# Patient Record
Sex: FEMALE | Race: WHITE | NOT HISPANIC OR LATINO | Employment: OTHER | URBAN - METROPOLITAN AREA
[De-identification: names, ages, dates, MRNs, and addresses within clinical notes are randomized per-mention and may not be internally consistent; named-entity substitution may affect disease eponyms.]

---

## 2023-10-02 ENCOUNTER — CLINICAL SUPPORT (OUTPATIENT)
Dept: PRIMARY CARE | Facility: CLINIC | Age: 83
End: 2023-10-02
Payer: COMMERCIAL

## 2023-10-02 DIAGNOSIS — Z23 FLU VACCINE NEED: Primary | ICD-10-CM

## 2023-10-02 DIAGNOSIS — Z23 NEED FOR IMMUNIZATION AGAINST INFLUENZA: Primary | ICD-10-CM

## 2023-10-02 PROCEDURE — 90662 IIV NO PRSV INCREASED AG IM: CPT | Performed by: FAMILY MEDICINE

## 2023-10-02 PROCEDURE — G0008 ADMIN INFLUENZA VIRUS VAC: HCPCS | Performed by: FAMILY MEDICINE

## 2023-10-11 ENCOUNTER — TELEPHONE (OUTPATIENT)
Dept: PRIMARY CARE | Facility: CLINIC | Age: 83
End: 2023-10-11
Payer: COMMERCIAL

## 2023-10-11 DIAGNOSIS — E78.00 LOW-DENSITY-LIPOID-TYPE (LDL) HYPERLIPOPROTEINEMIA: ICD-10-CM

## 2023-10-11 DIAGNOSIS — I10 BENIGN ESSENTIAL HTN: ICD-10-CM

## 2023-10-11 DIAGNOSIS — R73.9 ELEVATED BLOOD SUGAR: ICD-10-CM

## 2023-10-11 DIAGNOSIS — E03.9 HYPOTHYROIDISM, UNSPECIFIED TYPE: ICD-10-CM

## 2023-10-24 PROBLEM — E78.00 LOW-DENSITY-LIPOID-TYPE (LDL) HYPERLIPOPROTEINEMIA: Status: ACTIVE | Noted: 2023-10-24

## 2023-10-24 PROBLEM — E03.9 HYPOTHYROIDISM, UNSPECIFIED: Status: ACTIVE | Noted: 2022-03-28

## 2024-03-18 DIAGNOSIS — E03.9 HYPOTHYROIDISM, UNSPECIFIED TYPE: ICD-10-CM

## 2024-03-18 DIAGNOSIS — I10 BENIGN ESSENTIAL HTN: ICD-10-CM

## 2024-03-19 RX ORDER — METOPROLOL SUCCINATE 50 MG/1
50 TABLET, EXTENDED RELEASE ORAL DAILY
Qty: 90 TABLET | Refills: 3 | Status: SHIPPED | OUTPATIENT
Start: 2024-03-19

## 2024-03-19 RX ORDER — LEVOTHYROXINE SODIUM 50 UG/1
50 TABLET ORAL DAILY
Qty: 90 TABLET | Refills: 3 | Status: SHIPPED | OUTPATIENT
Start: 2024-03-19

## 2024-04-10 ENCOUNTER — LAB (OUTPATIENT)
Dept: LAB | Facility: LAB | Age: 84
End: 2024-04-10
Payer: COMMERCIAL

## 2024-04-10 DIAGNOSIS — I10 BENIGN ESSENTIAL HTN: ICD-10-CM

## 2024-04-10 DIAGNOSIS — R73.9 ELEVATED BLOOD SUGAR: ICD-10-CM

## 2024-04-10 DIAGNOSIS — E78.00 LOW-DENSITY-LIPOID-TYPE (LDL) HYPERLIPOPROTEINEMIA: ICD-10-CM

## 2024-04-10 DIAGNOSIS — E03.9 HYPOTHYROIDISM, UNSPECIFIED TYPE: ICD-10-CM

## 2024-04-10 LAB
ALBUMIN SERPL-MCNC: 4.4 G/DL (ref 3.5–5)
ALP BLD-CCNC: 88 U/L (ref 35–125)
ALT SERPL-CCNC: 19 U/L (ref 5–40)
ANION GAP SERPL CALC-SCNC: 13 MMOL/L
AST SERPL-CCNC: 26 U/L (ref 5–40)
BASOPHILS # BLD AUTO: 0.08 X10*3/UL (ref 0–0.1)
BASOPHILS NFR BLD AUTO: 1.2 %
BILIRUB SERPL-MCNC: 0.6 MG/DL (ref 0.1–1.2)
BUN SERPL-MCNC: 23 MG/DL (ref 8–25)
CALCIUM SERPL-MCNC: 9 MG/DL (ref 8.5–10.4)
CHLORIDE SERPL-SCNC: 104 MMOL/L (ref 97–107)
CHOLEST SERPL-MCNC: 154 MG/DL (ref 133–200)
CHOLEST/HDLC SERPL: 2.9 {RATIO}
CO2 SERPL-SCNC: 26 MMOL/L (ref 24–31)
CREAT SERPL-MCNC: 1 MG/DL (ref 0.4–1.6)
EGFRCR SERPLBLD CKD-EPI 2021: 56 ML/MIN/1.73M*2
EOSINOPHIL # BLD AUTO: 0.16 X10*3/UL (ref 0–0.4)
EOSINOPHIL NFR BLD AUTO: 2.4 %
ERYTHROCYTE [DISTWIDTH] IN BLOOD BY AUTOMATED COUNT: 13.2 % (ref 11.5–14.5)
EST. AVERAGE GLUCOSE BLD GHB EST-MCNC: 105 MG/DL
GLUCOSE SERPL-MCNC: 104 MG/DL (ref 65–99)
HBA1C MFR BLD: 5.3 %
HCT VFR BLD AUTO: 41.4 % (ref 36–46)
HDLC SERPL-MCNC: 54 MG/DL
HGB BLD-MCNC: 13.3 G/DL (ref 12–16)
IMM GRANULOCYTES # BLD AUTO: 0.01 X10*3/UL (ref 0–0.5)
IMM GRANULOCYTES NFR BLD AUTO: 0.2 % (ref 0–0.9)
LDLC SERPL CALC-MCNC: 85 MG/DL (ref 65–130)
LYMPHOCYTES # BLD AUTO: 1.82 X10*3/UL (ref 0.8–3)
LYMPHOCYTES NFR BLD AUTO: 27.7 %
MCH RBC QN AUTO: 30.5 PG (ref 26–34)
MCHC RBC AUTO-ENTMCNC: 32.1 G/DL (ref 32–36)
MCV RBC AUTO: 95 FL (ref 80–100)
MONOCYTES # BLD AUTO: 0.43 X10*3/UL (ref 0.05–0.8)
MONOCYTES NFR BLD AUTO: 6.5 %
NEUTROPHILS # BLD AUTO: 4.07 X10*3/UL (ref 1.6–5.5)
NEUTROPHILS NFR BLD AUTO: 62 %
NRBC BLD-RTO: 0 /100 WBCS (ref 0–0)
PLATELET # BLD AUTO: 247 X10*3/UL (ref 150–450)
POTASSIUM SERPL-SCNC: 4.5 MMOL/L (ref 3.4–5.1)
PROT SERPL-MCNC: 6.8 G/DL (ref 5.9–7.9)
RBC # BLD AUTO: 4.36 X10*6/UL (ref 4–5.2)
SODIUM SERPL-SCNC: 143 MMOL/L (ref 133–145)
TRIGL SERPL-MCNC: 73 MG/DL (ref 40–150)
TSH SERPL DL<=0.05 MIU/L-ACNC: 2.81 MIU/L (ref 0.27–4.2)
WBC # BLD AUTO: 6.6 X10*3/UL (ref 4.4–11.3)

## 2024-04-10 PROCEDURE — 85025 COMPLETE CBC W/AUTO DIFF WBC: CPT

## 2024-04-10 PROCEDURE — 36415 COLL VENOUS BLD VENIPUNCTURE: CPT

## 2024-04-10 PROCEDURE — 80061 LIPID PANEL: CPT

## 2024-04-10 PROCEDURE — 84443 ASSAY THYROID STIM HORMONE: CPT

## 2024-04-10 PROCEDURE — 83036 HEMOGLOBIN GLYCOSYLATED A1C: CPT

## 2024-04-10 PROCEDURE — 80053 COMPREHEN METABOLIC PANEL: CPT

## 2024-04-18 ENCOUNTER — OFFICE VISIT (OUTPATIENT)
Dept: PRIMARY CARE | Facility: CLINIC | Age: 84
End: 2024-04-18
Payer: COMMERCIAL

## 2024-04-18 VITALS
HEIGHT: 62 IN | HEART RATE: 77 BPM | WEIGHT: 105 LBS | SYSTOLIC BLOOD PRESSURE: 135 MMHG | DIASTOLIC BLOOD PRESSURE: 68 MMHG | BODY MASS INDEX: 19.32 KG/M2 | OXYGEN SATURATION: 98 %

## 2024-04-18 DIAGNOSIS — Z12.31 VISIT FOR SCREENING MAMMOGRAM: ICD-10-CM

## 2024-04-18 DIAGNOSIS — F41.9 ANXIETY: ICD-10-CM

## 2024-04-18 DIAGNOSIS — I10 BENIGN ESSENTIAL HTN: Primary | ICD-10-CM

## 2024-04-18 DIAGNOSIS — E03.9 HYPOTHYROIDISM, UNSPECIFIED TYPE: ICD-10-CM

## 2024-04-18 DIAGNOSIS — Z00.00 WELL ADULT EXAM: ICD-10-CM

## 2024-04-18 PROCEDURE — 93000 ELECTROCARDIOGRAM COMPLETE: CPT | Performed by: FAMILY MEDICINE

## 2024-04-18 PROCEDURE — 3075F SYST BP GE 130 - 139MM HG: CPT | Performed by: FAMILY MEDICINE

## 2024-04-18 PROCEDURE — G0439 PPPS, SUBSEQ VISIT: HCPCS | Performed by: FAMILY MEDICINE

## 2024-04-18 PROCEDURE — 3078F DIAST BP <80 MM HG: CPT | Performed by: FAMILY MEDICINE

## 2024-04-18 PROCEDURE — 1159F MED LIST DOCD IN RCRD: CPT | Performed by: FAMILY MEDICINE

## 2024-04-18 PROCEDURE — 1036F TOBACCO NON-USER: CPT | Performed by: FAMILY MEDICINE

## 2024-04-18 PROCEDURE — 1126F AMNT PAIN NOTED NONE PRSNT: CPT | Performed by: FAMILY MEDICINE

## 2024-04-18 PROCEDURE — 1170F FXNL STATUS ASSESSED: CPT | Performed by: FAMILY MEDICINE

## 2024-04-18 RX ORDER — AMLODIPINE BESYLATE 5 MG/1
5 TABLET ORAL DAILY
Qty: 90 TABLET | Refills: 3 | Status: SHIPPED | OUTPATIENT
Start: 2024-04-18 | End: 2025-04-18

## 2024-04-18 RX ORDER — AMLODIPINE BESYLATE 5 MG/1
5 TABLET ORAL DAILY
COMMUNITY
End: 2024-04-18 | Stop reason: SDUPTHER

## 2024-04-18 ASSESSMENT — ENCOUNTER SYMPTOMS
LOSS OF SENSATION IN FEET: 0
OCCASIONAL FEELINGS OF UNSTEADINESS: 0
DEPRESSION: 0

## 2024-04-18 ASSESSMENT — PATIENT HEALTH QUESTIONNAIRE - PHQ9
1. LITTLE INTEREST OR PLEASURE IN DOING THINGS: NOT AT ALL
2. FEELING DOWN, DEPRESSED OR HOPELESS: NOT AT ALL
SUM OF ALL RESPONSES TO PHQ9 QUESTIONS 1 AND 2: 0

## 2024-04-18 ASSESSMENT — ACTIVITIES OF DAILY LIVING (ADL)
GROCERY_SHOPPING: INDEPENDENT
TAKING_MEDICATION: INDEPENDENT
BATHING: INDEPENDENT
DOING_HOUSEWORK: INDEPENDENT
MANAGING_FINANCES: INDEPENDENT
DRESSING: INDEPENDENT

## 2024-04-18 ASSESSMENT — PAIN SCALES - GENERAL: PAINLEVEL: 0-NO PAIN

## 2024-04-18 NOTE — PROGRESS NOTES
Subjective   Patient ID: Caryn Agarwal is a 83 y.o. female who presents for Medicare Annual Wellness Visit Subsequent (EKG-   2018/Mammogram-   needs order today/Colonoscopy   12/2016).    HPI   CARYN is seen for for her comprehensive physical exam. PMH, PSH, family history and social history were reviewed and updated.  CARYN is here for follow-up of hypertension. Patient denies chest pain, shortness of breath and dizziness .  On metoprolol succinate 50 mg qd, and amlodipine 5 mg qd.  CARYN is seen for follow-up of Hypothyroidism. She denies palpitations, weight gain and weight loss. On levothyroxine 50 mcg qd. Recent TSH is therapeutic. Pt is asymptomatic  Patient has intermittent anxiety uses alprazolam as needed.  Patient has a history of intermittent headaches that are preceded with an aura . She gets them very infrequently and ibuprofen is effective for them.    Patient had a tetanus shot in 2016.  She has not had a shingles immunization.  She had Prevnar 13 in 2017.  She was immunized against coronavirus x5.  She had her influenza immunization 10/2023.   Patient had a colonoscopy in 2016.  Patient is a former tobacco user but quit more than 40 years ago.  She does not use alcohol.  Review of Systems  Constitutional Symptoms:  She is negative for fever, night sweats, hot flashes, weight loss, Weight loss due to diet, weight gain due to diet, unexpected weight gain, loss of appetite, increased appetite, headaches, fatigue, abnormal activity level, Sleep Disturbance, Recent Illness.   Eyes:  She is negative for blindness, blind spots, loss and blurring of vision, double vision, swelling, redness, pruritus, eye pain, discharge, dryness of eyes.   Ear, Nose, Mouth, Throat:  She is negative for hearing loss, wearing hearing aids, tinnitus, ear pain, ear drainage, dizziness, allergies, nasal congestion, rhinorrhea, nasal obstruction, post nasal drip, nose bleeds, teeth problems, wearing dentures, mouth sores, gum  disease, dysphagia, hoarseness, sore throat, tinnitus, sleep apnea.   Cardiovascular:  She is negative for chest pain/pressure, radiation of pain, palpitations, shortness of breath, dyspnea on exertion, orthopnea, syncope, diaphoresis, cyanosis, edema.   Respiratory:  She is negative for shortness of breath, dyspnea on exertion, coughing, sputum, hemoptysis, wheezing, snoring.   Breast:  She is negative for tenderness, masses, nipple discharge, gynecomastia.   Gastrointestinal:  She is negative for anorexia, indigestion, increased belching, food intolerance, use of antacids, nausea, hematemesis, jaundice, abdominal pain, change in bowel habits, diarrhea, constipation, abnormal stools, hematochezia, melena, blood in stool, increased flatus, hemorrhoids.   Female Genitourinary:  She is negative for frequency, nocturia, dysuria, hematuria, recurrent UTIs, hot flashes, Dyspareunia.   Musculoskeletal:  She is positive for joint pain - (hand arthritis, right knee, intermittent). She is negative for joint swelling, joint warmth, joint redness, myalgias.   Integumentary:  She is positive for Non-healing skin lesion - (skin Ca, sees Derm). She is negative for change in mole, skin trouble or rash, itching, dryness, loss of hair, hirsutism.   Neurological:  She is positive for occasional headache with visual aura. She is negative for headache, speech difficulty, numbness, tingling, weakness, paralysis, tremors, dizziness, syncope, balance problems, memory loss, .   Psychiatric:  She is negative for depression, moodiness, anhedonia, change in sleep pattern, disturbing thoughts or feelings, change in libido, suicidal thoughts or attempts, anxiety, panic attacks, obsessive thoughts, compulsions, hyperactivity.   Endocrine:  She is negative for weight gain, heat or cold intolerance, excessive sweating, polydipsia.   Hematologic/Lymphatic:  She is negative for bruising, abnormal bleeding, bleeding gums, nose bleeds, swollen  "glands.  Objective   /68   Pulse 77   Ht 1.575 m (5' 2\")   Wt 47.6 kg (105 lb)   SpO2 98%   BMI 19.20 kg/m²     Physical Exam  General Appearance: LENA is in no acute distress. She is well nourished, well developed. The patient is awake and alert and appears stated age. LENA is cooperative with exam.  Head:   LENA's hair pattern is normal for patients age and The scalp is normal . The skull is normocephalic, atraumatic. The face is unremarkable with no facial droop. Palpation of the head reveals no tenderness or masses.  Eyes: PERRLA, EOMI, no scleral icterus.  Normal position and alignment. Eyebrows are normal.  Ears, Nose, Mouth, Throat:   EARS: External bilateral ears reveal normal helix, tragus and ear lobe.  Both canals are normal.  Tympanic membranes are pearly gray, normal landmarks, good light reflex. Hearing is normal to finger rub   NOSE: Nasal mucosa is normal with no polyps, ulcers or lesions in Septum has no deviation.   MOUTH: Lips are normal with no lesion. Oral mucosa is moist.  Hard and soft palates are normal. Teeth are in good repair. Tongue reveals is normal. Tonsils are present with no lesions. Uvula is normal. Posterior pharynx without lesions.  Neck: Inspection of the neck reveals no masses or JVD.   Inspection reveals normal thyroid gland. Palpation shows normal thyroid gland. with No carotid bruit present.   Anterior cervical lymph node chains are unremarkable. Posterior chains are unremarkable.  Chest: Chest is symmetric. Lungs are clear to auscultation and percussion, no respiratory distress. Breathing is normal.  Cardiovascular: Heart is RRR, normal S1, S2. No murmurs, rubs or gallops. PMI is normal in left 6th IC space midclavicular line. Femoral pulses are present and without bruits. DP pulses are present. Extremities: no edema, clubbing, cyanosis, or varicosities.  Breast: INSPECTION: Size and symmetry: Breasts are normal and symmetric . PALPATION: Skin of Breasts are " normal. Breast tissue: normal with no significant masses Nipples: without discharge.  Abdomen: Abdomen is soft, NT, ND. BS + 4 quadrants. No organomegaly or masses..  Genitourinary:  Deferred  Lymph Nodes: Palpation of the cervical area are within normal limit. Palpation of the axillary area are within normal limit. Palpation of the inguinal area are within normal limit.  Musculoskeletal:  patient has mild area of edema on the dorsum of the left hand with resolving ecchymosis.  Mildly tender to palpation.Patient has a valgus deformity of the right lower leg. Gait is normal. Extremities: Full Range of motion and strength throughout.  Skin: Skin has deferred, sees Dermatology.  Neurological: Intact and non-focal. Cranial nerves II - XII are grossly intact. Motor exams reveals normal tone and strength , Sensation: normal to touch, position and vibration. DTR: are +2/4 and symmetric at the AJ and KJ and no Babinski.  Psychiatric: Proper orientation to person, place and time. Recent memory is intact. Remote memory is intact. Patient's mood is normal with good eye contact. Affect is appropriate.  Assessment/Plan   Problem List Items Addressed This Visit             ICD-10-CM    Benign essential HTN - Primary   stable.  Continue on amlodipine 5 mg daily, and metoprolol succinate 50 mg daily. I10    Relevant Medications    amLODIPine (Norvasc) 5 mg tablet    Hypothyroidism, unspecified   stable.  Continue on levothyroxine 50 mcg daily. E03.9     Other Visit Diagnoses         Codes    Well adult exam    normal exam. Z00.00    Relevant Orders    ECG 12 lead (Clinic Performed) (Completed)    Anxiety    intermittent.  Continue use alprazolam as needed. F41.9    Visit for screening mammogram    normal exam.  Patient given requisition for mammogram. Z12.31    Relevant Orders    BI mammo bilateral screening tomosynthesis

## 2024-04-23 ENCOUNTER — APPOINTMENT (OUTPATIENT)
Dept: RADIOLOGY | Facility: HOSPITAL | Age: 84
DRG: 310 | End: 2024-04-23
Payer: COMMERCIAL

## 2024-04-23 ENCOUNTER — APPOINTMENT (OUTPATIENT)
Dept: CARDIOLOGY | Facility: HOSPITAL | Age: 84
DRG: 310 | End: 2024-04-23
Payer: COMMERCIAL

## 2024-04-23 ENCOUNTER — HOSPITAL ENCOUNTER (OUTPATIENT)
Facility: HOSPITAL | Age: 84
Discharge: HOME | DRG: 310 | End: 2024-04-24
Attending: STUDENT IN AN ORGANIZED HEALTH CARE EDUCATION/TRAINING PROGRAM | Admitting: INTERNAL MEDICINE
Payer: COMMERCIAL

## 2024-04-23 DIAGNOSIS — I48.91 ATRIAL FIBRILLATION WITH RAPID VENTRICULAR RESPONSE (MULTI): Primary | ICD-10-CM

## 2024-04-23 DIAGNOSIS — R11.0 NAUSEA: ICD-10-CM

## 2024-04-23 DIAGNOSIS — R06.02 SHORTNESS OF BREATH AT REST: ICD-10-CM

## 2024-04-23 LAB
ALBUMIN SERPL-MCNC: 4 G/DL (ref 3.5–5)
ALP BLD-CCNC: 83 U/L (ref 35–125)
ALT SERPL-CCNC: 19 U/L (ref 5–40)
ANION GAP SERPL CALC-SCNC: 13 MMOL/L
APTT PPP: 25.7 SECONDS (ref 22–32.5)
AST SERPL-CCNC: 24 U/L (ref 5–40)
ATRIAL RATE: 141 BPM
BASOPHILS # BLD AUTO: 0.06 X10*3/UL (ref 0–0.1)
BASOPHILS NFR BLD AUTO: 0.7 %
BILIRUB SERPL-MCNC: 0.8 MG/DL (ref 0.1–1.2)
BUN SERPL-MCNC: 22 MG/DL (ref 8–25)
CALCIUM SERPL-MCNC: 8.9 MG/DL (ref 8.5–10.4)
CHLORIDE SERPL-SCNC: 97 MMOL/L (ref 97–107)
CO2 SERPL-SCNC: 23 MMOL/L (ref 24–31)
CREAT SERPL-MCNC: 0.9 MG/DL (ref 0.4–1.6)
EGFRCR SERPLBLD CKD-EPI 2021: 64 ML/MIN/1.73M*2
EOSINOPHIL # BLD AUTO: 0.06 X10*3/UL (ref 0–0.4)
EOSINOPHIL NFR BLD AUTO: 0.7 %
ERYTHROCYTE [DISTWIDTH] IN BLOOD BY AUTOMATED COUNT: 12.9 % (ref 11.5–14.5)
ERYTHROCYTE [DISTWIDTH] IN BLOOD BY AUTOMATED COUNT: 12.9 % (ref 11.5–14.5)
GLUCOSE SERPL-MCNC: 146 MG/DL (ref 65–99)
HCT VFR BLD AUTO: 39.1 % (ref 36–46)
HCT VFR BLD AUTO: 39.8 % (ref 36–46)
HGB BLD-MCNC: 13 G/DL (ref 12–16)
HGB BLD-MCNC: 13.2 G/DL (ref 12–16)
IMM GRANULOCYTES # BLD AUTO: 0.02 X10*3/UL (ref 0–0.5)
IMM GRANULOCYTES NFR BLD AUTO: 0.2 % (ref 0–0.9)
LIPASE SERPL-CCNC: 40 U/L (ref 16–63)
LYMPHOCYTES # BLD AUTO: 1.4 X10*3/UL (ref 0.8–3)
LYMPHOCYTES NFR BLD AUTO: 16.6 %
MCH RBC QN AUTO: 30.2 PG (ref 26–34)
MCH RBC QN AUTO: 30.3 PG (ref 26–34)
MCHC RBC AUTO-ENTMCNC: 33.2 G/DL (ref 32–36)
MCHC RBC AUTO-ENTMCNC: 33.2 G/DL (ref 32–36)
MCV RBC AUTO: 91 FL (ref 80–100)
MCV RBC AUTO: 91 FL (ref 80–100)
MONOCYTES # BLD AUTO: 0.34 X10*3/UL (ref 0.05–0.8)
MONOCYTES NFR BLD AUTO: 4 %
NEUTROPHILS # BLD AUTO: 6.54 X10*3/UL (ref 1.6–5.5)
NEUTROPHILS NFR BLD AUTO: 77.8 %
NRBC BLD-RTO: 0 /100 WBCS (ref 0–0)
NRBC BLD-RTO: 0 /100 WBCS (ref 0–0)
PLATELET # BLD AUTO: 197 X10*3/UL (ref 150–450)
PLATELET # BLD AUTO: 201 X10*3/UL (ref 150–450)
POTASSIUM SERPL-SCNC: 3.7 MMOL/L (ref 3.4–5.1)
PROT SERPL-MCNC: 6.9 G/DL (ref 5.9–7.9)
Q ONSET: 228 MS
QRS COUNT: 20 BEATS
QRS DURATION: 86 MS
QT INTERVAL: 310 MS
QTC CALCULATION(BAZETT): 447 MS
QTC FREDERICIA: 395 MS
R AXIS: -69 DEGREES
RBC # BLD AUTO: 4.29 X10*6/UL (ref 4–5.2)
RBC # BLD AUTO: 4.37 X10*6/UL (ref 4–5.2)
SODIUM SERPL-SCNC: 133 MMOL/L (ref 133–145)
T AXIS: 88 DEGREES
T OFFSET: 383 MS
TROPONIN T SERPL-MCNC: 21 NG/L
TROPONIN T SERPL-MCNC: 22 NG/L
TROPONIN T SERPL-MCNC: 31 NG/L
TSH SERPL DL<=0.05 MIU/L-ACNC: 1.73 MIU/L (ref 0.27–4.2)
VENTRICULAR RATE: 125 BPM
WBC # BLD AUTO: 10.2 X10*3/UL (ref 4.4–11.3)
WBC # BLD AUTO: 8.4 X10*3/UL (ref 4.4–11.3)

## 2024-04-23 PROCEDURE — 96374 THER/PROPH/DIAG INJ IV PUSH: CPT

## 2024-04-23 PROCEDURE — 36415 COLL VENOUS BLD VENIPUNCTURE: CPT | Performed by: STUDENT IN AN ORGANIZED HEALTH CARE EDUCATION/TRAINING PROGRAM

## 2024-04-23 PROCEDURE — 93005 ELECTROCARDIOGRAM TRACING: CPT

## 2024-04-23 PROCEDURE — 84075 ASSAY ALKALINE PHOSPHATASE: CPT | Performed by: STUDENT IN AN ORGANIZED HEALTH CARE EDUCATION/TRAINING PROGRAM

## 2024-04-23 PROCEDURE — 96375 TX/PRO/DX INJ NEW DRUG ADDON: CPT

## 2024-04-23 PROCEDURE — 85027 COMPLETE CBC AUTOMATED: CPT | Performed by: INTERNAL MEDICINE

## 2024-04-23 PROCEDURE — 2500000004 HC RX 250 GENERAL PHARMACY W/ HCPCS (ALT 636 FOR OP/ED): Performed by: INTERNAL MEDICINE

## 2024-04-23 PROCEDURE — 84484 ASSAY OF TROPONIN QUANT: CPT | Performed by: STUDENT IN AN ORGANIZED HEALTH CARE EDUCATION/TRAINING PROGRAM

## 2024-04-23 PROCEDURE — 99291 CRITICAL CARE FIRST HOUR: CPT | Mod: 25 | Performed by: STUDENT IN AN ORGANIZED HEALTH CARE EDUCATION/TRAINING PROGRAM

## 2024-04-23 PROCEDURE — 71046 X-RAY EXAM CHEST 2 VIEWS: CPT

## 2024-04-23 PROCEDURE — 2020000001 HC ICU ROOM DAILY

## 2024-04-23 PROCEDURE — 71046 X-RAY EXAM CHEST 2 VIEWS: CPT | Performed by: RADIOLOGY

## 2024-04-23 PROCEDURE — 84443 ASSAY THYROID STIM HORMONE: CPT | Performed by: INTERNAL MEDICINE

## 2024-04-23 PROCEDURE — 2500000004 HC RX 250 GENERAL PHARMACY W/ HCPCS (ALT 636 FOR OP/ED): Performed by: STUDENT IN AN ORGANIZED HEALTH CARE EDUCATION/TRAINING PROGRAM

## 2024-04-23 PROCEDURE — 85025 COMPLETE CBC W/AUTO DIFF WBC: CPT | Performed by: STUDENT IN AN ORGANIZED HEALTH CARE EDUCATION/TRAINING PROGRAM

## 2024-04-23 PROCEDURE — G0378 HOSPITAL OBSERVATION PER HR: HCPCS

## 2024-04-23 PROCEDURE — 85730 THROMBOPLASTIN TIME PARTIAL: CPT | Performed by: INTERNAL MEDICINE

## 2024-04-23 PROCEDURE — 83690 ASSAY OF LIPASE: CPT | Performed by: STUDENT IN AN ORGANIZED HEALTH CARE EDUCATION/TRAINING PROGRAM

## 2024-04-23 RX ORDER — HEPARIN SODIUM 10000 [USP'U]/100ML
0-4000 INJECTION, SOLUTION INTRAVENOUS CONTINUOUS
Status: DISCONTINUED | OUTPATIENT
Start: 2024-04-23 | End: 2024-04-24 | Stop reason: HOSPADM

## 2024-04-23 RX ORDER — HEPARIN SODIUM 5000 [USP'U]/ML
60 INJECTION, SOLUTION INTRAVENOUS; SUBCUTANEOUS ONCE
Status: COMPLETED | OUTPATIENT
Start: 2024-04-23 | End: 2024-04-23

## 2024-04-23 RX ORDER — DIPHENHYDRAMINE HYDROCHLORIDE 50 MG/ML
12.5 INJECTION INTRAMUSCULAR; INTRAVENOUS ONCE
Status: COMPLETED | OUTPATIENT
Start: 2024-04-23 | End: 2024-04-23

## 2024-04-23 RX ORDER — METOPROLOL SUCCINATE 50 MG/1
50 TABLET, EXTENDED RELEASE ORAL 2 TIMES DAILY
Status: DISCONTINUED | OUTPATIENT
Start: 2024-04-24 | End: 2024-04-24 | Stop reason: HOSPADM

## 2024-04-23 RX ORDER — ONDANSETRON HYDROCHLORIDE 2 MG/ML
4 INJECTION, SOLUTION INTRAVENOUS ONCE
Status: COMPLETED | OUTPATIENT
Start: 2024-04-23 | End: 2024-04-23

## 2024-04-23 RX ORDER — PROCHLORPERAZINE EDISYLATE 5 MG/ML
10 INJECTION INTRAMUSCULAR; INTRAVENOUS ONCE
Status: COMPLETED | OUTPATIENT
Start: 2024-04-23 | End: 2024-04-23

## 2024-04-23 RX ORDER — METOPROLOL SUCCINATE 50 MG/1
50 TABLET, EXTENDED RELEASE ORAL DAILY
Status: DISCONTINUED | OUTPATIENT
Start: 2024-04-24 | End: 2024-04-23

## 2024-04-23 RX ORDER — ALPRAZOLAM 0.25 MG/1
0.25 TABLET ORAL NIGHTLY PRN
Status: DISCONTINUED | OUTPATIENT
Start: 2024-04-23 | End: 2024-04-24 | Stop reason: HOSPADM

## 2024-04-23 RX ORDER — LEVOTHYROXINE SODIUM 50 UG/1
50 TABLET ORAL DAILY
Status: DISCONTINUED | OUTPATIENT
Start: 2024-04-24 | End: 2024-04-24 | Stop reason: HOSPADM

## 2024-04-23 RX ORDER — HEPARIN SODIUM 5000 [USP'U]/ML
INJECTION, SOLUTION INTRAVENOUS; SUBCUTANEOUS AS NEEDED
Status: DISCONTINUED | OUTPATIENT
Start: 2024-04-23 | End: 2024-04-24 | Stop reason: HOSPADM

## 2024-04-23 RX ORDER — DILTIAZEM HCL/D5W 125 MG/125
5-15 PLASTIC BAG, INJECTION (ML) INTRAVENOUS CONTINUOUS
Status: DISCONTINUED | OUTPATIENT
Start: 2024-04-23 | End: 2024-04-24 | Stop reason: HOSPADM

## 2024-04-23 RX ORDER — ALPRAZOLAM 0.25 MG/1
0.25 TABLET ORAL 3 TIMES DAILY PRN
Status: DISCONTINUED | OUTPATIENT
Start: 2024-04-23 | End: 2024-04-23

## 2024-04-23 RX ORDER — AMLODIPINE BESYLATE 5 MG/1
5 TABLET ORAL DAILY
Status: DISCONTINUED | OUTPATIENT
Start: 2024-04-24 | End: 2024-04-24 | Stop reason: HOSPADM

## 2024-04-23 RX ADMIN — DIPHENHYDRAMINE HYDROCHLORIDE 12.5 MG: 50 INJECTION, SOLUTION INTRAMUSCULAR; INTRAVENOUS at 15:53

## 2024-04-23 RX ADMIN — ONDANSETRON 4 MG: 2 INJECTION INTRAMUSCULAR; INTRAVENOUS at 14:24

## 2024-04-23 RX ADMIN — PROCHLORPERAZINE EDISYLATE 10 MG: 5 INJECTION INTRAMUSCULAR; INTRAVENOUS at 15:54

## 2024-04-23 RX ADMIN — Medication 5 MG/HR: at 14:51

## 2024-04-23 RX ADMIN — HEPARIN SODIUM 600 UNITS/HR: 10000 INJECTION, SOLUTION INTRAVENOUS at 19:01

## 2024-04-23 RX ADMIN — HEPARIN SODIUM 2850 UNITS: 5000 INJECTION, SOLUTION INTRAVENOUS; SUBCUTANEOUS at 18:56

## 2024-04-23 SDOH — SOCIAL STABILITY: SOCIAL INSECURITY: ABUSE: ADULT

## 2024-04-23 SDOH — SOCIAL STABILITY: SOCIAL INSECURITY: DO YOU FEEL ANYONE HAS EXPLOITED OR TAKEN ADVANTAGE OF YOU FINANCIALLY OR OF YOUR PERSONAL PROPERTY?: NO

## 2024-04-23 SDOH — SOCIAL STABILITY: SOCIAL INSECURITY: ARE YOU OR HAVE YOU BEEN THREATENED OR ABUSED PHYSICALLY, EMOTIONALLY, OR SEXUALLY BY ANYONE?: NO

## 2024-04-23 SDOH — SOCIAL STABILITY: SOCIAL INSECURITY: DOES ANYONE TRY TO KEEP YOU FROM HAVING/CONTACTING OTHER FRIENDS OR DOING THINGS OUTSIDE YOUR HOME?: NO

## 2024-04-23 SDOH — SOCIAL STABILITY: SOCIAL INSECURITY: DO YOU FEEL UNSAFE GOING BACK TO THE PLACE WHERE YOU ARE LIVING?: NO

## 2024-04-23 SDOH — SOCIAL STABILITY: SOCIAL INSECURITY: HAVE YOU HAD ANY THOUGHTS OF HARMING ANYONE ELSE?: NO

## 2024-04-23 SDOH — SOCIAL STABILITY: SOCIAL INSECURITY: ARE THERE ANY APPARENT SIGNS OF INJURIES/BEHAVIORS THAT COULD BE RELATED TO ABUSE/NEGLECT?: NO

## 2024-04-23 SDOH — SOCIAL STABILITY: SOCIAL INSECURITY: HAVE YOU HAD THOUGHTS OF HARMING ANYONE ELSE?: NO

## 2024-04-23 SDOH — SOCIAL STABILITY: SOCIAL INSECURITY: HAS ANYONE EVER THREATENED TO HURT YOUR FAMILY OR YOUR PETS?: NO

## 2024-04-23 ASSESSMENT — LIFESTYLE VARIABLES
AUDIT-C TOTAL SCORE: 0
HOW MANY STANDARD DRINKS CONTAINING ALCOHOL DO YOU HAVE ON A TYPICAL DAY: PATIENT DOES NOT DRINK
SUBSTANCE_ABUSE_PAST_12_MONTHS: NO
SKIP TO QUESTIONS 9-10: 1
HOW OFTEN DO YOU HAVE 6 OR MORE DRINKS ON ONE OCCASION: NEVER
PRESCIPTION_ABUSE_PAST_12_MONTHS: NO
AUDIT-C TOTAL SCORE: 0
HOW OFTEN DO YOU HAVE A DRINK CONTAINING ALCOHOL: NEVER

## 2024-04-23 ASSESSMENT — ACTIVITIES OF DAILY LIVING (ADL)
PATIENT'S MEMORY ADEQUATE TO SAFELY COMPLETE DAILY ACTIVITIES?: YES
DRESSING YOURSELF: INDEPENDENT
HEARING - LEFT EAR: FUNCTIONAL
LACK_OF_TRANSPORTATION: NO
FEEDING YOURSELF: INDEPENDENT
BATHING: INDEPENDENT
TOILETING: INDEPENDENT
ASSISTIVE_DEVICE: DENTURES PARTIAL
HEARING - RIGHT EAR: FUNCTIONAL
ADEQUATE_TO_COMPLETE_ADL: YES
GROOMING: INDEPENDENT
WALKS IN HOME: INDEPENDENT
JUDGMENT_ADEQUATE_SAFELY_COMPLETE_DAILY_ACTIVITIES: YES

## 2024-04-23 ASSESSMENT — COGNITIVE AND FUNCTIONAL STATUS - GENERAL
DAILY ACTIVITIY SCORE: 24
MOBILITY SCORE: 24
PATIENT BASELINE BEDBOUND: NO

## 2024-04-23 ASSESSMENT — PAIN - FUNCTIONAL ASSESSMENT
PAIN_FUNCTIONAL_ASSESSMENT: 0-10

## 2024-04-23 ASSESSMENT — PATIENT HEALTH QUESTIONNAIRE - PHQ9
SUM OF ALL RESPONSES TO PHQ9 QUESTIONS 1 & 2: 0
1. LITTLE INTEREST OR PLEASURE IN DOING THINGS: NOT AT ALL
2. FEELING DOWN, DEPRESSED OR HOPELESS: NOT AT ALL

## 2024-04-23 ASSESSMENT — PAIN SCALES - GENERAL
PAINLEVEL_OUTOF10: 0 - NO PAIN

## 2024-04-23 NOTE — H&P
History Of Present Illness  Caryn Agarwal is a 83 y.o. female presenting with feeling nauseous and a little abnormal today.  Was in her usual state of health when this morning she started to feel off and nauseous.  Presents emergency department found to be in atrial fibrillation with rapid ventricular spots.  Patient denies chest pain or shortness of breath.  Denies diarrhea denies abdominal pain.  Denies PND orthopnea     Past Medical History  She has a past medical history of Encounter for screening mammogram for malignant neoplasm of breast, Hypertension, Hypothyroid, Other conditions influencing health status, and Personal history of other diseases of the digestive system.    Surgical History  She has a past surgical history that includes Hernia repair (04/15/2013) and MR angio head w and wo IV contrast (2012).     Social History  She reports that she has quit smoking. Her smoking use included cigarettes. She started smoking about 54 years ago. She has a 13.6 pack-year smoking history. She has never been exposed to tobacco smoke. She has never used smokeless tobacco. She reports that she does not drink alcohol and does not use drugs.    Family History  Family History   Problem Relation Name Age of Onset    No Known Problems Mother      Heart attack Father           age 66        Allergies  Propoxyphene and Sertraline    Review of Systems  History otherwise 14 point review of systems unremarkable  Physical Exam  No acute distress  HEENT PERRLA EOMi  Cardiovascular S1-S2 irregularly irregular rate and rhythm  Lungs clear to auscultation bilaterally  Abdomen nontender soft bowel sounds present  Extremities no clubbing cyanosis or edema    Last Recorded Vitals  /66 (BP Location: Right arm, Patient Position: Lying)   Pulse 83   Temp 36.8 °C (98.2 °F) (Temporal)   Resp 18   Wt 47.6 kg (105 lb)   SpO2 95%     Relevant Results  Scheduled medications  amLODIPine, 5 mg, oral, Daily  heparin (porcine),  60 Units/kg, intravenous, Once  levothyroxine, 50 mcg, oral, Daily  metoprolol succinate XL, 50 mg, oral, Daily      Continuous medications  dilTIAZem, 5-15 mg/hr, Last Rate: 10 mg/hr (04/23/24 1529)  heparin, 0-4,000 Units/hr      PRN medications  PRN medications: ALPRAZolam, heparin (porcine)  Results for orders placed or performed during the hospital encounter of 04/23/24 (from the past 24 hour(s))   ECG 12 lead   Result Value Ref Range    Ventricular Rate 125 BPM    Atrial Rate 141 BPM    QRS Duration 86 ms    QT Interval 310 ms    QTC Calculation(Bazett) 447 ms    R Axis -69 degrees    T Axis 88 degrees    QRS Count 20 beats    Q Onset 228 ms    T Offset 383 ms    QTC Fredericia 395 ms   CBC with Differential   Result Value Ref Range    WBC 8.4 4.4 - 11.3 x10*3/uL    nRBC 0.0 0.0 - 0.0 /100 WBCs    RBC 4.37 4.00 - 5.20 x10*6/uL    Hemoglobin 13.2 12.0 - 16.0 g/dL    Hematocrit 39.8 36.0 - 46.0 %    MCV 91 80 - 100 fL    MCH 30.2 26.0 - 34.0 pg    MCHC 33.2 32.0 - 36.0 g/dL    RDW 12.9 11.5 - 14.5 %    Platelets 197 150 - 450 x10*3/uL    Neutrophils % 77.8 40.0 - 80.0 %    Immature Granulocytes %, Automated 0.2 0.0 - 0.9 %    Lymphocytes % 16.6 13.0 - 44.0 %    Monocytes % 4.0 2.0 - 10.0 %    Eosinophils % 0.7 0.0 - 6.0 %    Basophils % 0.7 0.0 - 2.0 %    Neutrophils Absolute 6.54 (H) 1.60 - 5.50 x10*3/uL    Immature Granulocytes Absolute, Automated 0.02 0.00 - 0.50 x10*3/uL    Lymphocytes Absolute 1.40 0.80 - 3.00 x10*3/uL    Monocytes Absolute 0.34 0.05 - 0.80 x10*3/uL    Eosinophils Absolute 0.06 0.00 - 0.40 x10*3/uL    Basophils Absolute 0.06 0.00 - 0.10 x10*3/uL   Comprehensive Metabolic Panel   Result Value Ref Range    Glucose 146 (H) 65 - 99 mg/dL    Sodium 133 133 - 145 mmol/L    Potassium 3.7 3.4 - 5.1 mmol/L    Chloride 97 97 - 107 mmol/L    Bicarbonate 23 (L) 24 - 31 mmol/L    Urea Nitrogen 22 8 - 25 mg/dL    Creatinine 0.90 0.40 - 1.60 mg/dL    eGFR 64 >60 mL/min/1.73m*2    Calcium 8.9 8.5 - 10.4  mg/dL    Albumin 4.0 3.5 - 5.0 g/dL    Alkaline Phosphatase 83 35 - 125 U/L    Total Protein 6.9 5.9 - 7.9 g/dL    AST 24 5 - 40 U/L    Bilirubin, Total 0.8 0.1 - 1.2 mg/dL    ALT 19 5 - 40 U/L    Anion Gap 13 <=19 mmol/L   Serial Troponin, Initial ([facility])   Result Value Ref Range    Troponin T, High Sensitivity 22 (HH) <=14 ng/L   Lipase   Result Value Ref Range    Lipase 40 16 - 63 U/L   Serial Troponin, 2 Hour ([facility])   Result Value Ref Range    Troponin T, High Sensitivity 21 (HH) <=14 ng/L   TSH   Result Value Ref Range    Thyroid Stimulating Hormone 1.73 0.27 - 4.20 mIU/L     Impression: 83-year-old woman with a past medical history of hypertension admitted with atrial fibrillation with rapid ventricular response.    Plan:    1.  Atrial fibrillation with rapid ventricular sponsor  -Continue diltiazem drip, started heparin drip low intensity, increase Toprol-XL to twice daily  -Continue with Norvasc monitor BP  -2D echocardiogram ordered  -Pending cardiology count    2.  Hypertension  -As above    Full code         Assessment/Plan   Principal Problem:    Atrial fibrillation with rapid ventricular response (Multi)             Akil Dwyer MD

## 2024-04-23 NOTE — CARE PLAN
The patient's goals for the shift include      The clinical goals for the shift include monitor heart rate and rhythm

## 2024-04-23 NOTE — ED PROVIDER NOTES
HPI   Chief Complaint   Patient presents with    Atrial Fibrillation    Nausea     Patient bib mentor for co nausea/vomiting. Patient does not have any hx of afib. Per EMS rate of 170. EMS started fluids and 4mg of zofran given and now afib at 115. Patient is a&ox4 and nad. Patient co some chest discomfort.        Patient presents complaining of nausea and feeling unwell.  On EMS arrival, heart rate was found to be in the 170s in atrial fibrillation.  Patient reports history of hypertension but no history of atrial fibrillation.  She does take metoprolol and amlodipine for her high blood pressure.  She continues to feel nauseated at this time.  She denies any abdominal pain.  Nothing like this has happened previously.                          Navjot Coma Scale Score: 15                     Patient History   Past Medical History:   Diagnosis Date    Encounter for screening mammogram for malignant neoplasm of breast     Visit for screening mammogram    Hypertension     Hypothyroid     Other conditions influencing health status     Chronic Gastric Ulcer With Hemorrhage    Personal history of other diseases of the digestive system     History of esophageal reflux     Past Surgical History:   Procedure Laterality Date    HERNIA REPAIR  04/15/2013    Hernia Repair    MR HEAD ANGIO W AND WO IV CONTRAST  2012    MR HEAD ANGIO W AND WO IV CONTRAST [facility]     Family History   Problem Relation Name Age of Onset    No Known Problems Mother      Heart attack Father           age 66     Social History     Tobacco Use    Smoking status: Former     Current packs/day: 0.25     Average packs/day: 0.3 packs/day for 54.3 years (13.6 ttl pk-yrs)     Types: Cigarettes     Start date:      Passive exposure: Never    Smokeless tobacco: Never   Substance Use Topics    Alcohol use: Never    Drug use: Never       Physical Exam   ED Triage Vitals [24 1407]   Temperature Heart Rate Respirations BP   36.4 °C (97.5  °F) (!) 115 16 178/74      Pulse Ox Temp Source Heart Rate Source Patient Position   98 % Temporal -- --      BP Location FiO2 (%)     -- --       Physical Exam  HENT:      Head: Normocephalic.   Eyes:      General: No scleral icterus.  Cardiovascular:      Rate and Rhythm: Tachycardia present. Rhythm irregular.   Pulmonary:      Effort: Pulmonary effort is normal.   Abdominal:      Palpations: Abdomen is soft.      Comments: Nontender to palpation   Skin:     General: Skin is warm.   Neurological:      General: No focal deficit present.      Mental Status: She is alert.         ED Course & MDM   ED Course as of 04/23/24 1438   Tue Apr 23, 2024   1411 EKG interpretation: Atrial fibrillation with rapid ventricular response, rate 125.  Leftward axis.  T wave inversion noted in aVL.  Approximately 1 mm of ST depression noted diffusely. [ML]      ED Course User Index  [ML] Jayesh Burciaga MD         Diagnoses as of 04/23/24 1438   Atrial fibrillation with rapid ventricular response (Multi)   Nausea       Medical Decision Making  Patient presenting in atrial fibrillation with rapid ventricular response.  Not having any chest pain or palpitations at this time.  Due to tachycardia, patient placed on diltiazem drip with improvement in heart rate.  Laboratory studies unremarkable.  Patient accepted to hospitalist service for further management.  Parts of this chart were completed with dictation software, please excuse any errors in transcription.        Procedure  Critical Care    Performed by: Jayesh Burciaga MD  Authorized by: Jayesh Burciaga MD    Critical care provider statement:     Critical care time (minutes):  31    Critical care time was exclusive of:  Separately billable procedures and treating other patients    Critical care was necessary to treat or prevent imminent or life-threatening deterioration of the following conditions:  Cardiac failure    Critical care was time spent personally by me on the following  activities:  Development of treatment plan with patient or surrogate, evaluation of patient's response to treatment, examination of patient, obtaining history from patient or surrogate, ordering and performing treatments and interventions, ordering and review of laboratory studies, ordering and review of radiographic studies, pulse oximetry and re-evaluation of patient's condition    Care discussed with: admitting provider         Jayesh Burciaga MD  04/23/24 2404

## 2024-04-23 NOTE — PROGRESS NOTES
Pharmacy Medication History Review    Caryn Agarwal is a 83 y.o. female admitted for Atrial fibrillation with rapid ventricular response (Multi). Pharmacy reviewed the patient's [id number] medications and allergies for accuracy.    The list below reflectives the updated PTA list. Please review each medication in order reconciliation for additional clarification and justification.  Prior to Admission Medications   Prescriptions Last Dose Informant Patient Reported? Taking?   ALPRAZolam (Xanax) 0.25 mg tablet Past Month  No Yes   Sig: TAKE 1 TABLET BY MOUTH THREE TIMES DAILY AS NEEDED FOR ANXIETY   amLODIPine (Norvasc) 5 mg tablet 4/23/2024 at am  No Yes   Sig: Take 1 tablet (5 mg) by mouth once daily.   levothyroxine (Synthroid, Levoxyl) 50 mcg tablet 4/23/2024 at am  No Yes   Sig: Take 1 tablet by mouth once daily   metoprolol succinate XL (Toprol-XL) 50 mg 24 hr tablet 4/23/2024 at am  No Yes   Sig: Take 1 tablet by mouth once daily      Facility-Administered Medications: None         The list below reflectives the updated allergy list. Please review each documented allergy for additional clarification and justification.      Allergies  Reviewed by Dayana Cabrales CPhT on 4/23/2024        Severity Reactions Comments    Propoxyphene Medium Other hallucinations    Sertraline Medium Insomnia             Below are additional concerns with the patient's PTA list.  - There are no additional concerns at this time    DAYANA CABRALES CPhT

## 2024-04-24 ENCOUNTER — HOSPITAL ENCOUNTER (OUTPATIENT)
Dept: CARDIOLOGY | Facility: HOSPITAL | Age: 84
Discharge: HOME | DRG: 310 | End: 2024-04-24
Payer: COMMERCIAL

## 2024-04-24 ENCOUNTER — APPOINTMENT (OUTPATIENT)
Dept: CARDIOLOGY | Facility: HOSPITAL | Age: 84
DRG: 310 | End: 2024-04-24
Payer: COMMERCIAL

## 2024-04-24 VITALS
DIASTOLIC BLOOD PRESSURE: 71 MMHG | RESPIRATION RATE: 18 BRPM | HEART RATE: 71 BPM | SYSTOLIC BLOOD PRESSURE: 148 MMHG | OXYGEN SATURATION: 95 % | TEMPERATURE: 96.6 F | BODY MASS INDEX: 19.31 KG/M2 | WEIGHT: 104.94 LBS | HEIGHT: 62 IN

## 2024-04-24 LAB
ANION GAP SERPL CALC-SCNC: 11 MMOL/L
AORTIC VALVE MEAN GRADIENT: 4 MMHG
AORTIC VALVE PEAK VELOCITY: 1.39 M/S
APTT PPP: 43.4 SECONDS (ref 22–32.5)
APTT PPP: 46 SECONDS (ref 22–32.5)
ATRIAL RATE: 69 BPM
AV PEAK GRADIENT: 7.7 MMHG
AVA (PEAK VEL): 2.55 CM2
AVA (VTI): 2.47 CM2
BUN SERPL-MCNC: 19 MG/DL (ref 8–25)
CALCIUM SERPL-MCNC: 8.8 MG/DL (ref 8.5–10.4)
CHLORIDE SERPL-SCNC: 98 MMOL/L (ref 97–107)
CO2 SERPL-SCNC: 24 MMOL/L (ref 24–31)
CREAT SERPL-MCNC: 0.9 MG/DL (ref 0.4–1.6)
EGFRCR SERPLBLD CKD-EPI 2021: 64 ML/MIN/1.73M*2
EJECTION FRACTION APICAL 4 CHAMBER: 63
GLUCOSE SERPL-MCNC: 102 MG/DL (ref 65–99)
LEFT ATRIUM VOLUME AREA LENGTH INDEX BSA: 24.2 ML/M2
LEFT VENTRICLE INTERNAL DIMENSION DIASTOLE: 3.8 CM (ref 3.5–6)
LEFT VENTRICULAR OUTFLOW TRACT DIAMETER: 2 CM
LV EJECTION FRACTION BIPLANE: 60 %
MITRAL VALVE E/A RATIO: 0.63
MITRAL VALVE E/E' RATIO: 8.63
P AXIS: 68 DEGREES
P OFFSET: 183 MS
P ONSET: 121 MS
POTASSIUM SERPL-SCNC: 3.8 MMOL/L (ref 3.4–5.1)
PR INTERVAL: 184 MS
Q ONSET: 213 MS
QRS COUNT: 12 BEATS
QRS DURATION: 92 MS
QT INTERVAL: 434 MS
QTC CALCULATION(BAZETT): 465 MS
QTC FREDERICIA: 454 MS
R AXIS: -54 DEGREES
RIGHT VENTRICLE FREE WALL PEAK S': 14.9 CM/S
RIGHT VENTRICLE PEAK SYSTOLIC PRESSURE: 23.3 MMHG
SODIUM SERPL-SCNC: 133 MMOL/L (ref 133–145)
T AXIS: 56 DEGREES
T OFFSET: 430 MS
TRICUSPID ANNULAR PLANE SYSTOLIC EXCURSION: 2 CM
VENTRICULAR RATE: 69 BPM

## 2024-04-24 PROCEDURE — 2500000001 HC RX 250 WO HCPCS SELF ADMINISTERED DRUGS (ALT 637 FOR MEDICARE OP): Performed by: INTERNAL MEDICINE

## 2024-04-24 PROCEDURE — 82374 ASSAY BLOOD CARBON DIOXIDE: CPT | Performed by: INTERNAL MEDICINE

## 2024-04-24 PROCEDURE — 93010 ELECTROCARDIOGRAM REPORT: CPT | Performed by: INTERNAL MEDICINE

## 2024-04-24 PROCEDURE — 99222 1ST HOSP IP/OBS MODERATE 55: CPT | Performed by: INTERNAL MEDICINE

## 2024-04-24 PROCEDURE — G0378 HOSPITAL OBSERVATION PER HR: HCPCS

## 2024-04-24 PROCEDURE — 93306 TTE W/DOPPLER COMPLETE: CPT

## 2024-04-24 PROCEDURE — 93005 ELECTROCARDIOGRAM TRACING: CPT

## 2024-04-24 PROCEDURE — 36415 COLL VENOUS BLD VENIPUNCTURE: CPT | Performed by: INTERNAL MEDICINE

## 2024-04-24 PROCEDURE — 85730 THROMBOPLASTIN TIME PARTIAL: CPT | Performed by: INTERNAL MEDICINE

## 2024-04-24 PROCEDURE — 93306 TTE W/DOPPLER COMPLETE: CPT | Performed by: INTERNAL MEDICINE

## 2024-04-24 RX ADMIN — METOPROLOL SUCCINATE 50 MG: 50 TABLET, EXTENDED RELEASE ORAL at 09:07

## 2024-04-24 RX ADMIN — LEVOTHYROXINE SODIUM 50 MCG: 50 TABLET ORAL at 06:06

## 2024-04-24 RX ADMIN — AMLODIPINE BESYLATE 5 MG: 5 TABLET ORAL at 09:07

## 2024-04-24 ASSESSMENT — PAIN SCALES - GENERAL: PAINLEVEL_OUTOF10: 0 - NO PAIN

## 2024-04-24 ASSESSMENT — PAIN - FUNCTIONAL ASSESSMENT: PAIN_FUNCTIONAL_ASSESSMENT: 0-10

## 2024-04-24 NOTE — CARE PLAN
The patient's goals for the shift include      The clinical goals for the shift include Pt will titrate off of Cardizem and return to normal heart rate by end of shift.    Over the shift, the patient did not make progress toward the following goals. Barriers to progression include acute illness. Recommendations to address these barriers include frequent small education sessions about care and ordered treatment  Problem: Pain  Goal: My pain/discomfort is manageable  Outcome: Progressing  Flowsheets (Taken 4/23/2024 2149)  Resident's pain/discomfort is manageable:   Include resident/family/caregiver in decisions related to pain management   Identify and avoid pain triggers   Offer non-pharmacological pain management interventions     Problem: Safety  Goal: Patient will be injury free during hospitalization  Outcome: Progressing  Goal: I will remain free of falls  Outcome: Progressing  Flowsheets (Taken 4/23/2024 2149)  Resident will remain free of falls:   Apply bed/chair alarms as appropriate   Assist with toileting as orderd   Assess and monitor medications that may increase fall risk   Accompany resident as ordered (ex. 1:1, stand-by assist, dayroom monitoring, 15 minute checks, line of sight)   Visual checks per facility policy   Consult with physical therapy as needed     Problem: Daily Care  Goal: Daily care needs are met  Outcome: Progressing  Flowsheets (Taken 4/23/2024 2149)  Daily care needs are met:   Assess and monitor ability to perform self care and identify potential discharge needs   Assist patient with activities of daily living as needed   Provide mouth care   Assess skin integrity/risk for skin breakdown   Encourage independent activity per ability   Include patient/family/caregiver in decisions related to daily care     Problem: Psychosocial Needs  Goal: Demonstrates ability to cope with hospitalization/illness  Outcome: Progressing  Flowsheets (Taken 4/23/2024 2149)  Demonstrates ability to cope  "with hospitalization/illness:   Encourage verbalization of feelings/concerns/expectations   Assist resident to identify and practice own strengths and abilities   Encourage participation in diversional activities   Include resident/family/caregiver in decisions related to psychosocial needs   Encourage resident to set and complete small goals for self   Reinforce positive adaptation of new coping behaviors  Goal: Collaborate with me, my family, and caregiver to identify my specific goals  Outcome: Progressing  Flowsheets  Taken 4/23/2024 2149 by Shahana Bose RN  Is there anything else we need to know to provide the best care possible?: \"not right now\".  Taken 4/23/2024 1714 by Leydi Stephen RN  Cultural Requests During Hospitalization: none  Spiritual Requests During Hospitalization: none     Problem: Discharge Barriers  Goal: My discharge needs are met  Outcome: Progressing  Flowsheets (Taken 4/23/2024 2149)  Resident's discharge needs are met:   Identify potential discharge barriers on admission and throughout stay   Involve resident/family/caregiver in discharge planning process     Problem: Skin  Goal: Participates in plan/prevention/treatment measures  Flowsheets (Taken 4/23/2024 2152)  Participates in plan/prevention/treatment measures: Increase activity/out of bed for meals  Goal: Promote/optimize nutrition  Flowsheets (Taken 4/23/2024 2152)  Promote/optimize nutrition:   Consume > 50% meals/supplements   Offer water/supplements/favorite foods   Monitor/record intake including meals   .    "

## 2024-04-24 NOTE — PROGRESS NOTES
04/24/24 1031   Discharge Planning   Home or Post Acute Services None   Patient expects to be discharged to: Home with no needs   Does the patient need discharge transport arranged? No

## 2024-04-24 NOTE — NURSING NOTE
Pt converted to NSR from AFIB and remained NSR from 1903 forward. Telemetry strip printed and placed in chart and pt Cardizem began titrating down.

## 2024-04-24 NOTE — CONSULTS
Consults  History Of Present Illness:    Caryn Agarwal is a 83 y.o. female presenting with new onset atrial fibrillation.    This patient is a quite pleasant 83-year-old white female whose past medical history is notable for hypertension treated with Toprol-XL 50 mg daily and amlodipine 5 mg daily.  Patient does have hypothyroidism with Synthroid replacement 50 mcg daily.  She denies any history of diabetes or hyperlipidemia.  She was a very remote smoker and quit at the age of about 41.  Her only operative procedure has been a left carpal tunnel release and hernia repair.  The patient is relatively active walks approximately 5 miles per day and golfs regularly in a league.    Patient was in usual state of health.  She went to a doctor's appointment with her  who has had eczema and was seeing an allergist.  Patient was stressed about the doctor's appointment.  Upon returning home her head felt like it would explode but not like a traditional headache.  She felt nauseated and vomited several times.  She checked her blood pressure which was elevated as well as the heart rate.  Her  advised that she come to the emergency room.    Initial evaluation in the emergency room included a chest x-ray which was clear with some findings suggestive of COPD.  The CBC and differential were normal.  Comprehensive metabolic panel was normal other than for glucose of 146.  High-sensitivity troponins were 22 repeated at 21.  The basic metabolic panel from today is in normal range with glucose of 102.  TSH was 1.73.  Patient was noted to be in atrial fibrillation with a rapid ventricular rate.  She was started on IV diltiazem infusion along with low intensity IV heparin infusion.  Her Toprol-XL was increased from 50 mg daily to twice daily.  The patient converted back to sinus rhythm at approximately 10:30 PM last evening.  She just had an echocardiogram completed reviewed preliminary basis.  This demonstrates an LV  ejection fraction that is preserved at 60%.  She does have mild late systolic mitral valve prolapse with 1-2+ mitral valve insufficiency normal left atrial size no visible thrombus.  She also has some mild age-related aortic valve sclerosis.         Last Recorded Vitals:  Vitals:    04/24/24 0500 04/24/24 0600 04/24/24 0700 04/24/24 0800   BP: 118/59 146/69 138/68 148/71   BP Location:   Right arm    Patient Position:   Lying    Pulse: 60 60 64 71   Resp: 16 16 18 18   Temp:   35.9 °C (96.6 °F)    TempSrc:   Temporal    SpO2: 96% 96% 96% 95%   Weight:       Height:           Last Labs:  CBC - 4/23/2024:  6:07 PM  10.2 13.0 201    39.1      CMP - 4/24/2024:  1:23 AM  8.8 6.9 24 --- 0.8   _ 4.0 19 83      PTT - 4/24/2024:  6:55 AM  _   _ 43.4     Hemoglobin A1C   Date/Time Value Ref Range Status   04/10/2024 08:35 AM 5.3 See below % Final   03/21/2022 08:19 AM 5.4 4.0 - 6.0 % Final     Comment:     Hemoglobin A1C levels are related to mean blood glucose during the   preceding 2-3 months. The relationship table below may be used as a   general guide. Each 1% increase in HGB A1C is a reflection of an   increase in mean glucose of approximately 30 mg/dl.   Reference: Diabetes Care, volume 29, supplement 1 Jan. 2006                        HGB A1C ................. Approx. Mean Glucose   _______________________________________________   6%   ...............................  120 mg/dl   7%   ...............................  150 mg/dl   8%   ...............................  180 mg/dl   9%   ...............................  210 mg/dl   10%  ...............................  240 mg/dl  Performed at [address] [address]     03/17/2021 08:02 AM 5.4 4.0 - 6.0 % Final     Comment:     Hemoglobin A1C levels are related to mean blood glucose during the   preceding 2-3 months. The relationship table below may be used as a   general guide. Each 1% increase in HGB A1C is a reflection of an   increase in mean  "glucose of approximately 30 mg/dl.   Reference: Diabetes Care, volume 29, supplement 1 Jan. 2006                        HGB A1C ................. Approx. Mean Glucose   _______________________________________________   6%   ...............................  120 mg/dl   7%   ...............................  150 mg/dl   8%   ...............................  180 mg/dl   9%   ...............................  210 mg/dl   10%  ...............................  240 mg/dl  Performed at [address] [address]       LDL Calculated   Date/Time Value Ref Range Status   04/10/2024 08:35 AM 85 65 - 130 mg/dL Final   04/04/2023 08:26 AM 75 65 - 130 MG/DL Final   03/21/2022 08:19 AM 71 65 - 130 MG/DL Final   03/17/2021 08:02 AM 74 65 - 130 MG/DL Final      Last I/O:  I/O last 3 completed shifts:  In: 331.6 (7 mL/kg) [P.O.:200; I.V.:131.6 (2.8 mL/kg)]  Out: - (0 mL/kg)   Weight: 47.6 kg     Past Cardiology Tests (Last 3 Years):  EKG:  ECG 12 lead 04/23/2024      ECG 12 lead (Clinic Performed) 04/18/2024    Echo:  No results found for this or any previous visit from the past 1095 days.    Ejection Fractions:  No results found for: \"EF\"  Cath:  No results found for this or any previous visit from the past 1095 days.    Stress Test:  No results found for this or any previous visit from the past 1095 days.    Cardiac Imaging:  No results found for this or any previous visit from the past 1095 days.      Past Medical History:  She has a past medical history of Encounter for screening mammogram for malignant neoplasm of breast, Hypertension, Hypothyroid, Other conditions influencing health status, and Personal history of other diseases of the digestive system.    Past Surgical History:  She has a past surgical history that includes Hernia repair (04/15/2013) and MR angio head w and wo IV contrast (1/4/2012).      Social History:  She reports that she has quit smoking. Her smoking use included cigarettes. She started " smoking about 54 years ago. She has a 13.6 pack-year smoking history. She has never been exposed to tobacco smoke. She has never used smokeless tobacco. She reports that she does not drink alcohol and does not use drugs.    Family History:  Family History   Problem Relation Name Age of Onset    No Known Problems Mother      Heart attack Father           age 66        Allergies:  Propoxyphene and Sertraline    Inpatient Medications:  Scheduled medications   Medication Dose Route Frequency    amLODIPine  5 mg oral Daily    levothyroxine  50 mcg oral Daily    metoprolol succinate XL  50 mg oral BID     PRN medications   Medication    ALPRAZolam    heparin (porcine)     Continuous Medications   Medication Dose Last Rate    dilTIAZem  5-15 mg/hr Stopped (24 2200)    heparin  0-4,000 Units/hr 700 Units/hr (24 0805)     Outpatient Medications:  Current Outpatient Medications   Medication Instructions    ALPRAZolam (XANAX) 0.25 mg, oral, 3 times daily PRN    amLODIPine (NORVASC) 5 mg, oral, Daily    levothyroxine (SYNTHROID, LEVOXYL) 50 mcg, oral, Daily    metoprolol succinate XL (TOPROL-XL) 50 mg, oral, Daily       Physical Exam:  The patient is a relatively thin elderly white female lying in bed awake alert conversant in no distress.  JVP not elevated carotid and pulses 2+ no bruits no palpable thyroid enlargement  Chest with fair air movement breath sounds are clear  The cardiac rhythm is regular no premature beats S1-S2 normal no gallop murmur rub click  Abdomen is soft and benign no focal tenderness  There is no peripheral edema pedal pulses present     Assessment/Plan     : This patient is a very pleasant intact 83-year-old white female with a history of hypertension treated with metoprolol and amlodipine, hypothyroidism requiring levothyroxine 50 mcg daily, remote smoking.  Patient was feeling well until last today afternoon when she developed nausea vomiting and has atypical headache.  Her blood  pressure and heart rate were elevated when checked at home and upon arrival to the emergency room she was noted to be in new onset atrial fibrillation rapid ventricular rate.  Patient was given IV diltiazem IV heparin and her Toprol-XL was increased from 50 mg daily to twice daily.  Patient converted back to sinus rhythm within several hours and remains in sinus rhythm.  Suspect that the episode of the atrial fibrillation may have been vagal mediated at the time of her nausea and vomiting.  At this point will not commit the patient to long-term anticoagulation.  For now we will continue usual Toprol-XL 50 mg daily.  Should she have future episodes of spontaneous atrial fibrillation without obvious provocation would consider increasing the dose of metoprolol and adding eliquis at that point in time.  Her echocardiogram during this admission is relatively unremarkable other than for some mild late systolic mitral valve prolapse of 1+ mitral valve insufficiency.  Patient should be cleared for discharge later today and arrangements will be made for her to have an outpatient follow-up visit in 2 to 3 weeks.    Peripheral IV 04/23/24 20 G Right Antecubital (Active)   Site Assessment Clean;Dry;Intact 04/24/24 0900   Dressing Status Clean;Dry;Occlusive 04/24/24 0900   Number of days: 1       Peripheral IV 04/23/24 20 G Left Antecubital (Active)   Site Assessment Clean;Dry;Intact 04/24/24 0900   Dressing Status Clean;Occlusive;Dry 04/24/24 0900   Number of days: 1       Code Status:  Full Code    I spent 30 minutes in the professional and overall care of this patient.        Sammy Marin MD

## 2024-04-24 NOTE — DISCHARGE SUMMARY
Discharge Diagnosis  Atrial fibrillation with rapid ventricular response (Multi)    Issues Requiring Follow-Up  Cardiology follow-up    Discharge Meds     Your medication list        CONTINUE taking these medications        Instructions Last Dose Given Next Dose Due   ALPRAZolam 0.25 mg tablet  Commonly known as: Xanax      TAKE 1 TABLET BY MOUTH THREE TIMES DAILY AS NEEDED FOR ANXIETY       amLODIPine 5 mg tablet  Commonly known as: Norvasc      Take 1 tablet (5 mg) by mouth once daily.       levothyroxine 50 mcg tablet  Commonly known as: Synthroid, Levoxyl      Take 1 tablet by mouth once daily       metoprolol succinate XL 50 mg 24 hr tablet  Commonly known as: Toprol-XL      Take 1 tablet by mouth once daily                Test Results Pending At Discharge  Pending Labs       No current pending labs.            Hospital Course   Patient admitted to the hospital after presenting to the emergency room for weakness.  Patient had a episode of vomiting prior to presentation to the ED.  Patient was found to be in atrial fibrillation with rapid ventricular response.  Patient was started on Cardizem and heparin drips and was admitted to hospital.  Patient converted into normal sinus rhythm relatively quickly.  Cardiology was consulted.  Echocardiogram was done with no evidence of heart failure.  Cardiology recommended no changes to her home medications which is Toprol-XL and Norvasc.  Patient does not need anticoagulation.  Patient will follow-up with cardiology as an outpatient.      Pertinent Physical Exam At Time of Discharge  Physical Exam  Generally no acute distress  HEENT PERRL EOMI  Cardiovascular S1 is 2 regular rate and rhythm  Lungs clear to auscultation  Abdomen nontender nondistended bowel sounds present  Extremities no clubbing cyanosis edema  Outpatient Follow-Up  Future Appointments   Date Time Provider Department Center   5/20/2024  4:15 PM Sammy Marin MD [id number]   5/29/2024  9:15 AM KE[facility]  MENT[facility]LES[facility] PARK[facility] [place] [facility]     Total time spent on discharge was 40 minutes    Akil Dwyer MD

## 2024-04-25 ENCOUNTER — PATIENT OUTREACH (OUTPATIENT)
Dept: PRIMARY CARE | Facility: CLINIC | Age: 84
End: 2024-04-25
Payer: COMMERCIAL

## 2024-04-25 ENCOUNTER — DOCUMENTATION (OUTPATIENT)
Dept: PRIMARY CARE | Facility: CLINIC | Age: 84
End: 2024-04-25
Payer: COMMERCIAL

## 2024-04-25 NOTE — PROGRESS NOTES
Discharge Facility: [facility]     Discharge Diagnosis:    Atrial fibrillation with rapid ventricular response (Multi)     Issues Requiring Follow-Up  Cardiology follow-up    Admission Date: 4/23/2024   Discharge Date:  4/24/2024     PCP Appointment Date: tasked to office     Specialist Appointment Date:     Dr. Marin Cardio 5/20/2024     Hospital Encounter and Summary: Linked     See discharge assessment below for further details      Engagement  Call Start Time: 1416 (4/25/2024  2:16 PM)    Medications  Medications reviewed with patient/caregiver?: Yes (4/25/2024  2:16 PM)  Is the patient having any side effects they believe may be caused by any medication additions or changes?: No (4/25/2024  2:16 PM)  Does the patient have all medications ordered at discharge?: Yes (4/25/2024  2:16 PM)  Care Management Interventions: No intervention needed (4/25/2024  2:16 PM)  Prescription Comments: no change in meds (4/25/2024  2:16 PM)  Is the patient taking all medications as directed (includes completed medication regime)?: -- (patient states no questions) (4/25/2024  2:16 PM)  Care Management Interventions: Provided patient education (4/25/2024  2:16 PM)  Medication Comments: no questions (4/25/2024  2:16 PM)    Appointments  Does the patient have a primary care provider?: Yes (4/25/2024  2:16 PM)  Care Management Interventions: Educated patient on importance of making appointment (PCP appt. tasked to office) (4/25/2024  2:16 PM)  Has the patient kept scheduled appointments due by today?: Yes (4/25/2024  2:16 PM)  Care Management Interventions: Advised patient to keep appointment (Dr. Marin 5/20/2024) (4/25/2024  2:16 PM)    Self Management  What is the home health agency?: NA (4/25/2024  2:16 PM)  What Durable Medical Equipment (DME) was ordered?: NA (4/25/2024  2:16 PM)    Patient Teaching  Does the patient have access to their discharge instructions?: Yes (4/25/2024  2:16 PM)  Care Management Interventions: Reviewed  instructions with patient (4/25/2024  2:16 PM)  What is the patient's perception of their health status since discharge?: Improving (4/25/2024  2:16 PM)  Is the patient/caregiver able to teach back the hierarchy of who to call/visit for symptoms/problems? PCP, Specialist, Home Health nurse, Urgent Care, ED, 911: Yes (4/25/2024  2:16 PM)  Patient/Caregiver Education Comments: Patient aware to call providers for any questions concerns or change  in condition , patient states she is playing golf today feeling well. (4/25/2024  2:16 PM)

## 2024-04-30 ENCOUNTER — TELEPHONE (OUTPATIENT)
Dept: INPATIENT UNIT | Facility: HOSPITAL | Age: 84
End: 2024-04-30

## 2024-04-30 ENCOUNTER — OFFICE VISIT (OUTPATIENT)
Dept: PRIMARY CARE | Facility: CLINIC | Age: 84
End: 2024-04-30
Payer: COMMERCIAL

## 2024-04-30 VITALS
HEART RATE: 67 BPM | OXYGEN SATURATION: 97 % | BODY MASS INDEX: 19.32 KG/M2 | DIASTOLIC BLOOD PRESSURE: 70 MMHG | WEIGHT: 105 LBS | HEIGHT: 62 IN | SYSTOLIC BLOOD PRESSURE: 140 MMHG

## 2024-04-30 DIAGNOSIS — I48.91 ATRIAL FIBRILLATION WITH RAPID VENTRICULAR RESPONSE (MULTI): Primary | ICD-10-CM

## 2024-04-30 PROCEDURE — 3077F SYST BP >= 140 MM HG: CPT | Performed by: FAMILY MEDICINE

## 2024-04-30 PROCEDURE — 1036F TOBACCO NON-USER: CPT | Performed by: FAMILY MEDICINE

## 2024-04-30 PROCEDURE — 99213 OFFICE O/P EST LOW 20 MIN: CPT | Performed by: FAMILY MEDICINE

## 2024-04-30 PROCEDURE — 1126F AMNT PAIN NOTED NONE PRSNT: CPT | Performed by: FAMILY MEDICINE

## 2024-04-30 PROCEDURE — 1159F MED LIST DOCD IN RCRD: CPT | Performed by: FAMILY MEDICINE

## 2024-04-30 PROCEDURE — 3078F DIAST BP <80 MM HG: CPT | Performed by: FAMILY MEDICINE

## 2024-04-30 ASSESSMENT — PATIENT HEALTH QUESTIONNAIRE - PHQ9
2. FEELING DOWN, DEPRESSED OR HOPELESS: NOT AT ALL
SUM OF ALL RESPONSES TO PHQ9 QUESTIONS 1 AND 2: 0
1. LITTLE INTEREST OR PLEASURE IN DOING THINGS: NOT AT ALL

## 2024-04-30 ASSESSMENT — PAIN SCALES - GENERAL: PAINLEVEL: 0-NO PAIN

## 2024-04-30 NOTE — PROGRESS NOTES
"Subjective   Patient ID: Caryn Agarwal is a 83 y.o. female who presents for Follow-up ([facility] ED  4/23/2024 for Afib-  /Has follow up appointment with cardiology Dr. Marin on 5/20/2024/No new medications started).    HPI here for follow-up to recent hospitalization.  She spent 1 day in the hospital when it was discovered that she had atrial fibrillation.  She was at home and felt increasingly nauseated and had a very high heart rate.   She was transported by squad where it was found that she was in atrial fibrillation with RVR.  She was given dose of diltiazem IV and that she snapped out of her atrial fibrillation.  She has not been in it since.  She feels fine at this time.  She will have follow-up with her cardiologist in the near future.  She continues on all her current medications which include amlodipine 5 mg daily, metoprolol succinate 50 mg daily.    Review of Systems  Constitutional: Patient is negative for fever, fatigue, weight change.  HEENT: Patient is negative for change in vision, hearing, swallow.  Cardio: Patient is negative for chest pain, lower extremity edema.  Pulmonary: Patient is negative for cough, shortness of breath.    Objective   /70   Pulse 67   Ht 1.575 m (5' 2\")   Wt 47.6 kg (105 lb)   SpO2 97%   BMI 19.20 kg/m²     Physical Exam  General: Awake and alert no apparent distress.  HEENT: Moist oral mucosa no cervical lymphadenopathy.  Cardiac: Heart S1-S2 no murmur rub or gallop.  Pulmonary: Lungs clear to auscultation bilaterally.  Assessment/Plan   Problem List Items Addressed This Visit             ICD-10-CM    Atrial fibrillation with rapid ventricular response (Multi) - Primary   resolved.  Patient to see cardiology in the near future. I48.91    Relevant Orders    Disability Placard          "

## 2024-05-03 ENCOUNTER — PATIENT OUTREACH (OUTPATIENT)
Dept: PRIMARY CARE | Facility: CLINIC | Age: 84
End: 2024-05-03
Payer: COMMERCIAL

## 2024-05-03 NOTE — PROGRESS NOTES
Call regarding appt. with PCP on 4/30/2024 after hospitalization.    At time of outreach call the patient feels as if their condition has improved since last visit.  Reviewed the PCP appointment with the pt and addressed any questions or concerns.

## 2024-05-17 PROBLEM — S80.10XA CONTUSION OF LEG: Status: ACTIVE | Noted: 2024-05-17

## 2024-05-17 PROBLEM — R11.10 VOMITING: Status: ACTIVE | Noted: 2022-06-05

## 2024-05-17 PROBLEM — R31.9 HEMATURIA: Status: ACTIVE | Noted: 2020-01-20

## 2024-05-17 PROBLEM — M19.049 OSTEOARTHRITIS, HAND: Status: ACTIVE | Noted: 2024-05-17

## 2024-05-17 PROBLEM — H10.10 ACUTE ALLERGIC CONJUNCTIVITIS: Status: ACTIVE | Noted: 2018-10-15

## 2024-05-17 PROBLEM — K21.9 GASTRO-ESOPHAGEAL REFLUX: Status: ACTIVE | Noted: 2020-12-28

## 2024-05-17 PROBLEM — R42 VERTIGO: Status: ACTIVE | Noted: 2022-06-05

## 2024-05-17 PROBLEM — F32.A DEPRESSION: Status: ACTIVE | Noted: 2020-02-05

## 2024-05-17 PROBLEM — S69.92XA INJURY OF LEFT HAND: Status: ACTIVE | Noted: 2023-04-11

## 2024-05-17 PROBLEM — R20.0 NUMBNESS: Status: ACTIVE | Noted: 2024-05-17

## 2024-05-17 PROBLEM — M54.41 LUMBAGO WITH SCIATICA, RIGHT SIDE: Status: ACTIVE | Noted: 2019-04-29

## 2024-05-17 PROBLEM — E87.1 HYPONATREMIA: Status: ACTIVE | Noted: 2020-02-06

## 2024-05-17 PROBLEM — S13.9XXA NECK SPRAIN: Status: ACTIVE | Noted: 2024-05-17

## 2024-05-17 PROBLEM — M17.9 OSTEOARTHRITIS OF KNEE: Status: ACTIVE | Noted: 2022-03-28

## 2024-05-17 PROBLEM — M17.11 ARTHRITIS OF RIGHT KNEE: Status: ACTIVE | Noted: 2019-05-10

## 2024-05-17 PROBLEM — M54.89 INTERSCAPULAR PAIN: Status: ACTIVE | Noted: 2024-05-17

## 2024-05-17 PROBLEM — M54.50 LOW BACK PAIN: Status: ACTIVE | Noted: 2020-01-20

## 2024-05-17 PROBLEM — M54.2 NECK PAIN: Status: ACTIVE | Noted: 2023-03-01

## 2024-05-17 PROBLEM — M25.529 ELBOW PAIN: Status: ACTIVE | Noted: 2024-05-17

## 2024-05-17 PROBLEM — M81.0 OSTEOPOROSIS: Status: ACTIVE | Noted: 2024-05-17

## 2024-05-17 PROBLEM — R22.32 MASS OF LEFT HAND: Status: ACTIVE | Noted: 2023-05-15

## 2024-05-17 PROBLEM — M25.552 LEFT HIP PAIN: Status: ACTIVE | Noted: 2020-12-28

## 2024-05-17 PROBLEM — M72.2 PLANTAR FASCIITIS, LEFT: Status: ACTIVE | Noted: 2024-05-17

## 2024-05-17 PROBLEM — F41.1 GENERALIZED ANXIETY DISORDER: Status: ACTIVE | Noted: 2020-09-03

## 2024-05-17 PROBLEM — M79.673 HEEL PAIN: Status: ACTIVE | Noted: 2024-05-17

## 2024-05-17 PROBLEM — N28.9 RENAL INSUFFICIENCY: Status: ACTIVE | Noted: 2024-05-17

## 2024-05-20 ENCOUNTER — OFFICE VISIT (OUTPATIENT)
Dept: CARDIOLOGY | Facility: CLINIC | Age: 84
End: 2024-05-20
Payer: COMMERCIAL

## 2024-05-20 VITALS
BODY MASS INDEX: 19.14 KG/M2 | WEIGHT: 104 LBS | SYSTOLIC BLOOD PRESSURE: 120 MMHG | HEART RATE: 65 BPM | OXYGEN SATURATION: 98 % | DIASTOLIC BLOOD PRESSURE: 71 MMHG | HEIGHT: 62 IN

## 2024-05-20 DIAGNOSIS — I48.91 ATRIAL FIBRILLATION, UNSPECIFIED TYPE (MULTI): Primary | ICD-10-CM

## 2024-05-20 PROCEDURE — 93005 ELECTROCARDIOGRAM TRACING: CPT | Performed by: NURSE PRACTITIONER

## 2024-05-20 PROCEDURE — 1036F TOBACCO NON-USER: CPT | Performed by: NURSE PRACTITIONER

## 2024-05-20 PROCEDURE — 99214 OFFICE O/P EST MOD 30 MIN: CPT | Performed by: NURSE PRACTITIONER

## 2024-05-20 PROCEDURE — 3078F DIAST BP <80 MM HG: CPT | Performed by: NURSE PRACTITIONER

## 2024-05-20 PROCEDURE — 1159F MED LIST DOCD IN RCRD: CPT | Performed by: NURSE PRACTITIONER

## 2024-05-20 PROCEDURE — 1160F RVW MEDS BY RX/DR IN RCRD: CPT | Performed by: NURSE PRACTITIONER

## 2024-05-20 PROCEDURE — 3074F SYST BP LT 130 MM HG: CPT | Performed by: NURSE PRACTITIONER

## 2024-05-20 PROCEDURE — 93010 ELECTROCARDIOGRAM REPORT: CPT | Performed by: INTERNAL MEDICINE

## 2024-05-20 ASSESSMENT — ENCOUNTER SYMPTOMS
GASTROINTESTINAL NEGATIVE: 1
MUSCULOSKELETAL NEGATIVE: 1
CONSTITUTIONAL NEGATIVE: 1
OCCASIONAL FEELINGS OF UNSTEADINESS: 0
DEPRESSION: 0
LOSS OF SENSATION IN FEET: 0
NEUROLOGICAL NEGATIVE: 1
RESPIRATORY NEGATIVE: 1
CARDIOVASCULAR NEGATIVE: 1

## 2024-05-20 NOTE — PROGRESS NOTES
"Chief Complaint:   New Patient Visit, Hospital Follow-up (2-3 weeks ago), and Atrial Fibrillation    History Of Present Illness:    .Ms Agarwal returns in follow up.  Denies chest pain, sob, palpitations or pedal edema.  She did not increase the metoprolol.          Last Recorded Vitals:  Blood pressure 120/71, pulse 65, height 1.575 m (5' 2\"), weight 47.2 kg (104 lb), SpO2 98%.     Past Medical History:  Past Medical History:   Diagnosis Date    Encounter for screening mammogram for malignant neoplasm of breast     Visit for screening mammogram    Hypertension     Hypothyroid     Other conditions influencing health status     Chronic Gastric Ulcer With Hemorrhage    Personal history of other diseases of the digestive system     History of esophageal reflux        Past Surgical History:  Past Surgical History:   Procedure Laterality Date    HERNIA REPAIR  04/15/2013    Hernia Repair    MR HEAD ANGIO W AND WO IV CONTRAST  1/4/2012    MR HEAD ANGIO W AND WO IV CONTRAST [facility]       Social History:  Social History     Socioeconomic History    Marital status:      Spouse name: None    Number of children: None    Years of education: None    Highest education level: None   Occupational History    None   Tobacco Use    Smoking status: Former     Current packs/day: 0.25     Average packs/day: 0.3 packs/day for 54.4 years (13.6 ttl pk-yrs)     Types: Cigarettes     Start date: 1970     Passive exposure: Never    Smokeless tobacco: Never   Substance and Sexual Activity    Alcohol use: Never    Drug use: Never    Sexual activity: None   Other Topics Concern    None   Social History Narrative    None     Social Determinants of Health     Financial Resource Strain: Low Risk  (4/23/2024)    Overall Financial Resource Strain (CARDIA)     Difficulty of Paying Living Expenses: Not hard at all   Food Insecurity: Not on file   Transportation Needs: No Transportation Needs (4/23/2024)    PRAPARE - Transportation     " Lack of Transportation (Medical): No     Lack of Transportation (Non-Medical): No   Physical Activity: Not on file   Stress: Not on file   Social Connections: Not on file   Intimate Partner Violence: Not on file   Housing Stability: Low Risk  (2024)    Housing Stability Vital Sign     Unable to Pay for Housing in the Last Year: No     Number of Places Lived in the Last Year: 1     Unstable Housing in the Last Year: No       Family History:  Family History   Problem Relation Name Age of Onset    Hypertension Mother      Heart attack Father           age 66    Cancer Daughter           Allergies:  Propoxyphene and Sertraline    Outpatient Medications:  Current Outpatient Medications   Medication Sig Dispense Refill    ALPRAZolam (Xanax) 0.25 mg tablet TAKE 1 TABLET BY MOUTH THREE TIMES DAILY AS NEEDED FOR ANXIETY 90 tablet 0    amLODIPine (Norvasc) 5 mg tablet Take 1 tablet (5 mg) by mouth once daily. 90 tablet 3    levothyroxine (Synthroid, Levoxyl) 50 mcg tablet Take 1 tablet by mouth once daily 90 tablet 3    metoprolol succinate XL (Toprol-XL) 50 mg 24 hr tablet Take 1 tablet by mouth once daily 90 tablet 3     No current facility-administered medications for this visit.        Physical Exam:  Cardiovascular:      PMI at left midclavicular line. Normal rate. Regular rhythm. Normal S1. Normal S2.       Murmurs: There is no murmur.      No gallop.  No click. No rub.   Pulses:     Intact distal pulses.   Edema:     Peripheral edema absent.         ROS:  Review of Systems   Constitutional: Negative.   Cardiovascular: Negative.    Respiratory: Negative.     Skin: Negative.    Musculoskeletal: Negative.    Gastrointestinal: Negative.    Genitourinary: Negative.    Neurological: Negative.           Last Labs:  CBC -  Lab Results   Component Value Date    WBC 10.2 2024    HGB 13.0 2024    HCT 39.1 2024    MCV 91 2024     2024       CMP -  Lab Results   Component Value Date     CALCIUM 8.8 04/24/2024    PROT 6.9 04/23/2024    ALBUMIN 4.0 04/23/2024    AST 24 04/23/2024    ALT 19 04/23/2024    ALKPHOS 83 04/23/2024    BILITOT 0.8 04/23/2024       LIPID PANEL -   Lab Results   Component Value Date    CHOL 154 04/10/2024    TRIG 73 04/10/2024    HDL 54.0 04/10/2024    CHHDL 2.9 04/10/2024       RENAL FUNCTION PANEL -   Lab Results   Component Value Date    GLUCOSE 102 (H) 04/24/2024     04/24/2024    K 3.8 04/24/2024    CL 98 04/24/2024    CO2 24 04/24/2024    ANIONGAP 11 04/24/2024    BUN 19 04/24/2024    CREATININE 0.90 04/24/2024    CALCIUM 8.8 04/24/2024    ALBUMIN 4.0 04/23/2024        Lab Results   Component Value Date    HGBA1C 5.3 04/10/2024         Assessment/Plan   Problem List Items Addressed This Visit    None      This patient is a quite pleasant 83-year-old white female whose past medical history is notable for hypertension treated with Toprol-XL 50 mg daily and amlodipine 5 mg daily.  Patient does have hypothyroidism with Synthroid replacement 50 mcg daily.  She denies any history of diabetes or hyperlipidemia.  She was a very remote smoker and quit at the age of about 41.  Her only operative procedure has been a left carpal tunnel release and hernia repair.  The patient is relatively active walks approximately 5 miles per day and golfs regularly in a league.     Patient was in usual state of health.  She went to a doctor's appointment with her  who has had eczema and was seeing an allergist.  Patient was stressed about the doctor's appointment.  Upon returning home her head felt like it would explode but not like a traditional headache.  She felt nauseated and vomited several times.  She checked her blood pressure which was elevated as well as the heart rate.  Her  advised that she come to the emergency room.     Initial evaluation in the emergency room included a chest x-ray which was clear with some findings suggestive of COPD.  The CBC and differential  were normal.  Comprehensive metabolic panel was normal other than for glucose of 146.  High-sensitivity troponins were 22 repeated at 21.  The basic metabolic panel from today is in normal range with glucose of 102.  TSH was 1.73.  Patient was noted to be in atrial fibrillation with a rapid ventricular rate.  She was started on IV diltiazem infusion along with low intensity IV heparin infusion.  Her Toprol-XL was increased from 50 mg daily to twice daily.  The patient converted back to sinus rhythm at approximately 10:30 PM last evening.  She just had an echocardiogram completed reviewed preliminary basis.  This demonstrates an LV ejection fraction that is preserved at 60%.  She does have mild late systolic mitral valve prolapse with 1-2+ mitral valve insufficiency normal left atrial size no visible thrombus.  She also has some mild age-related aortic valve sclerosis.     4/24: This patient is a very pleasant intact 83-year-old white female with a history of hypertension treated with metoprolol and amlodipine, hypothyroidism requiring levothyroxine 50 mcg daily, remote smoking. Patient was feeling well until last today afternoon when she developed nausea vomiting and has atypical headache. Her blood pressure and heart rate were elevated when checked at home and upon arrival to the emergency room she was noted to be in new onset atrial fibrillation rapid ventricular rate. Patient was given IV diltiazem IV heparin and her Toprol-XL was increased from 50 mg daily to twice daily. Patient converted back to sinus rhythm within several hours and remains in sinus rhythm. Suspect that the episode of the atrial fibrillation may have been vagal mediated at the time of her nausea and vomiting. At this point will not commit the patient to long-term anticoagulation. For now we will continue usual Toprol-XL 50 mg daily. Should she have future episodes of spontaneous atrial fibrillation without obvious provocation would consider  increasing the dose of metoprolol and adding eliquis at that point in time. Her echocardiogram during this admission is relatively unremarkable other than for some mild late systolic mitral valve prolapse of 1+ mitral valve insufficiency. Patient should be cleared for discharge later today and arrangements will be made for her to have an outpatient follow-up visit in 2 to 3 weeks.     ECG done today shows NSR and her metoprolol will be kept at 50 mg daily.     Kailee Jade, KATHY-CNP

## 2024-05-21 ENCOUNTER — PATIENT OUTREACH (OUTPATIENT)
Dept: PRIMARY CARE | Facility: CLINIC | Age: 84
End: 2024-05-21
Payer: COMMERCIAL

## 2024-05-21 NOTE — PROGRESS NOTES
Patient called for a F/U ,      LVM with call back number for patient to call if needed   If no voicemail available call attempts x 2 were made to contact the patient to assist with any questions or concerns patient may have.    L/M encouraged patient to call providers for any questions concerns or change in condition

## 2024-05-22 LAB
ATRIAL RATE: 65 BPM
P AXIS: 72 DEGREES
P OFFSET: 186 MS
P ONSET: 122 MS
PR INTERVAL: 182 MS
Q ONSET: 213 MS
QRS COUNT: 11 BEATS
QRS DURATION: 88 MS
QT INTERVAL: 428 MS
QTC CALCULATION(BAZETT): 445 MS
QTC FREDERICIA: 439 MS
R AXIS: -57 DEGREES
T AXIS: 73 DEGREES
T OFFSET: 427 MS
VENTRICULAR RATE: 65 BPM

## 2024-05-29 ENCOUNTER — HOSPITAL ENCOUNTER (OUTPATIENT)
Dept: RADIOLOGY | Facility: CLINIC | Age: 84
Discharge: HOME | End: 2024-05-29
Payer: COMMERCIAL

## 2024-05-29 VITALS — BODY MASS INDEX: 19.32 KG/M2 | WEIGHT: 105 LBS | HEIGHT: 62 IN

## 2024-05-29 DIAGNOSIS — Z12.31 VISIT FOR SCREENING MAMMOGRAM: ICD-10-CM

## 2024-05-29 PROCEDURE — 77067 SCR MAMMO BI INCL CAD: CPT | Performed by: RADIOLOGY

## 2024-05-29 PROCEDURE — 77063 BREAST TOMOSYNTHESIS BI: CPT | Performed by: RADIOLOGY

## 2024-05-29 PROCEDURE — 77067 SCR MAMMO BI INCL CAD: CPT

## 2024-07-09 ENCOUNTER — PATIENT OUTREACH (OUTPATIENT)
Dept: PRIMARY CARE | Facility: CLINIC | Age: 84
End: 2024-07-09
Payer: COMMERCIAL

## 2024-07-09 NOTE — PROGRESS NOTES
Unable to reach patient for a F/U call     LVM with call back number for patient to call if needed   If no voicemail available call attempts x 2 were made to contact the patient to assist with any questions or concerns patient may have.       L/M Patient encouraged to call providers for any questions concerns or change in condition

## 2024-07-17 ENCOUNTER — APPOINTMENT (OUTPATIENT)
Dept: RADIOLOGY | Facility: HOSPITAL | Age: 84
End: 2024-07-17
Payer: COMMERCIAL

## 2024-07-17 ENCOUNTER — APPOINTMENT (OUTPATIENT)
Dept: CARDIOLOGY | Facility: HOSPITAL | Age: 84
End: 2024-07-17
Payer: COMMERCIAL

## 2024-07-17 ENCOUNTER — OFFICE VISIT (OUTPATIENT)
Dept: PRIMARY CARE | Facility: CLINIC | Age: 84
End: 2024-07-17
Payer: COMMERCIAL

## 2024-07-17 ENCOUNTER — HOSPITAL ENCOUNTER (OUTPATIENT)
Facility: HOSPITAL | Age: 84
Setting detail: OBSERVATION
Discharge: HOME | End: 2024-07-18
Attending: STUDENT IN AN ORGANIZED HEALTH CARE EDUCATION/TRAINING PROGRAM | Admitting: FAMILY MEDICINE
Payer: COMMERCIAL

## 2024-07-17 VITALS
DIASTOLIC BLOOD PRESSURE: 82 MMHG | SYSTOLIC BLOOD PRESSURE: 160 MMHG | TEMPERATURE: 98 F | BODY MASS INDEX: 18.77 KG/M2 | HEIGHT: 62 IN | OXYGEN SATURATION: 98 % | HEART RATE: 83 BPM | WEIGHT: 102 LBS

## 2024-07-17 DIAGNOSIS — J00 ACUTE RHINITIS: Primary | ICD-10-CM

## 2024-07-17 DIAGNOSIS — R42 DIZZINESS: Primary | ICD-10-CM

## 2024-07-17 DIAGNOSIS — R51.9 PRESSURE IN HEAD: ICD-10-CM

## 2024-07-17 DIAGNOSIS — R42 VERTIGO: ICD-10-CM

## 2024-07-17 DIAGNOSIS — H61.23 BILATERAL IMPACTED CERUMEN: ICD-10-CM

## 2024-07-17 LAB
ALBUMIN SERPL-MCNC: 4.5 G/DL (ref 3.5–5)
ALP BLD-CCNC: 78 U/L (ref 35–125)
ALT SERPL-CCNC: 22 U/L (ref 5–40)
ANION GAP SERPL CALC-SCNC: 11 MMOL/L
AST SERPL-CCNC: 29 U/L (ref 5–40)
BASOPHILS # BLD AUTO: 0.07 X10*3/UL (ref 0–0.1)
BASOPHILS NFR BLD AUTO: 0.7 %
BILIRUB SERPL-MCNC: 0.6 MG/DL (ref 0.1–1.2)
BUN SERPL-MCNC: 17 MG/DL (ref 8–25)
CALCIUM SERPL-MCNC: 9.1 MG/DL (ref 8.5–10.4)
CHLORIDE SERPL-SCNC: 101 MMOL/L (ref 97–107)
CO2 SERPL-SCNC: 25 MMOL/L (ref 24–31)
CREAT SERPL-MCNC: 0.8 MG/DL (ref 0.4–1.6)
EGFRCR SERPLBLD CKD-EPI 2021: 73 ML/MIN/1.73M*2
EOSINOPHIL # BLD AUTO: 0.03 X10*3/UL (ref 0–0.4)
EOSINOPHIL NFR BLD AUTO: 0.3 %
ERYTHROCYTE [DISTWIDTH] IN BLOOD BY AUTOMATED COUNT: 12.8 % (ref 11.5–14.5)
GLUCOSE SERPL-MCNC: 130 MG/DL (ref 65–99)
HCT VFR BLD AUTO: 42 % (ref 36–46)
HGB BLD-MCNC: 13.8 G/DL (ref 12–16)
IMM GRANULOCYTES # BLD AUTO: 0.04 X10*3/UL (ref 0–0.5)
IMM GRANULOCYTES NFR BLD AUTO: 0.4 % (ref 0–0.9)
LYMPHOCYTES # BLD AUTO: 1.19 X10*3/UL (ref 0.8–3)
LYMPHOCYTES NFR BLD AUTO: 11.1 %
MCH RBC QN AUTO: 30.4 PG (ref 26–34)
MCHC RBC AUTO-ENTMCNC: 32.9 G/DL (ref 32–36)
MCV RBC AUTO: 93 FL (ref 80–100)
MONOCYTES # BLD AUTO: 0.42 X10*3/UL (ref 0.05–0.8)
MONOCYTES NFR BLD AUTO: 3.9 %
NEUTROPHILS # BLD AUTO: 8.98 X10*3/UL (ref 1.6–5.5)
NEUTROPHILS NFR BLD AUTO: 83.6 %
NRBC BLD-RTO: 0 /100 WBCS (ref 0–0)
PLATELET # BLD AUTO: 220 X10*3/UL (ref 150–450)
POTASSIUM SERPL-SCNC: 3.9 MMOL/L (ref 3.4–5.1)
PROT SERPL-MCNC: 7.4 G/DL (ref 5.9–7.9)
RBC # BLD AUTO: 4.54 X10*6/UL (ref 4–5.2)
SODIUM SERPL-SCNC: 137 MMOL/L (ref 133–145)
TROPONIN T SERPL-MCNC: 20 NG/L
TROPONIN T SERPL-MCNC: 23 NG/L
WBC # BLD AUTO: 10.7 X10*3/UL (ref 4.4–11.3)

## 2024-07-17 PROCEDURE — 70553 MRI BRAIN STEM W/O & W/DYE: CPT | Performed by: RADIOLOGY

## 2024-07-17 PROCEDURE — 71045 X-RAY EXAM CHEST 1 VIEW: CPT | Performed by: RADIOLOGY

## 2024-07-17 PROCEDURE — 2500000001 HC RX 250 WO HCPCS SELF ADMINISTERED DRUGS (ALT 637 FOR MEDICARE OP): Performed by: STUDENT IN AN ORGANIZED HEALTH CARE EDUCATION/TRAINING PROGRAM

## 2024-07-17 PROCEDURE — 99285 EMERGENCY DEPT VISIT HI MDM: CPT | Mod: 25

## 2024-07-17 PROCEDURE — A9575 INJ GADOTERATE MEGLUMI 0.1ML: HCPCS | Performed by: FAMILY MEDICINE

## 2024-07-17 PROCEDURE — 96374 THER/PROPH/DIAG INJ IV PUSH: CPT | Mod: 59

## 2024-07-17 PROCEDURE — 84484 ASSAY OF TROPONIN QUANT: CPT | Performed by: STUDENT IN AN ORGANIZED HEALTH CARE EDUCATION/TRAINING PROGRAM

## 2024-07-17 PROCEDURE — 80053 COMPREHEN METABOLIC PANEL: CPT | Performed by: STUDENT IN AN ORGANIZED HEALTH CARE EDUCATION/TRAINING PROGRAM

## 2024-07-17 PROCEDURE — 96361 HYDRATE IV INFUSION ADD-ON: CPT

## 2024-07-17 PROCEDURE — 2500000001 HC RX 250 WO HCPCS SELF ADMINISTERED DRUGS (ALT 637 FOR MEDICARE OP): Performed by: EMERGENCY MEDICINE

## 2024-07-17 PROCEDURE — 99213 OFFICE O/P EST LOW 20 MIN: CPT

## 2024-07-17 PROCEDURE — 85025 COMPLETE CBC W/AUTO DIFF WBC: CPT | Performed by: STUDENT IN AN ORGANIZED HEALTH CARE EDUCATION/TRAINING PROGRAM

## 2024-07-17 PROCEDURE — 2500000004 HC RX 250 GENERAL PHARMACY W/ HCPCS (ALT 636 FOR OP/ED): Performed by: FAMILY MEDICINE

## 2024-07-17 PROCEDURE — 36415 COLL VENOUS BLD VENIPUNCTURE: CPT | Performed by: STUDENT IN AN ORGANIZED HEALTH CARE EDUCATION/TRAINING PROGRAM

## 2024-07-17 PROCEDURE — 93005 ELECTROCARDIOGRAM TRACING: CPT

## 2024-07-17 PROCEDURE — 70450 CT HEAD/BRAIN W/O DYE: CPT | Performed by: RADIOLOGY

## 2024-07-17 PROCEDURE — G0378 HOSPITAL OBSERVATION PER HR: HCPCS

## 2024-07-17 PROCEDURE — 1159F MED LIST DOCD IN RCRD: CPT

## 2024-07-17 PROCEDURE — 1126F AMNT PAIN NOTED NONE PRSNT: CPT

## 2024-07-17 PROCEDURE — 3077F SYST BP >= 140 MM HG: CPT

## 2024-07-17 PROCEDURE — 2500000004 HC RX 250 GENERAL PHARMACY W/ HCPCS (ALT 636 FOR OP/ED): Performed by: EMERGENCY MEDICINE

## 2024-07-17 PROCEDURE — 71045 X-RAY EXAM CHEST 1 VIEW: CPT

## 2024-07-17 PROCEDURE — 70450 CT HEAD/BRAIN W/O DYE: CPT

## 2024-07-17 PROCEDURE — 1123F ACP DISCUSS/DSCN MKR DOCD: CPT

## 2024-07-17 PROCEDURE — 3079F DIAST BP 80-89 MM HG: CPT

## 2024-07-17 PROCEDURE — 70553 MRI BRAIN STEM W/O & W/DYE: CPT

## 2024-07-17 PROCEDURE — 1158F ADVNC CARE PLAN TLK DOCD: CPT

## 2024-07-17 RX ORDER — ONDANSETRON HYDROCHLORIDE 2 MG/ML
4 INJECTION, SOLUTION INTRAVENOUS ONCE
Status: COMPLETED | OUTPATIENT
Start: 2024-07-17 | End: 2024-07-17

## 2024-07-17 RX ORDER — MECLIZINE HYDROCHLORIDE 25 MG/1
25 TABLET ORAL 3 TIMES DAILY PRN
Status: DISCONTINUED | OUTPATIENT
Start: 2024-07-17 | End: 2024-07-17

## 2024-07-17 RX ORDER — FLUTICASONE PROPIONATE 50 MCG
1 SPRAY, SUSPENSION (ML) NASAL DAILY
Qty: 16 G | Refills: 0 | Status: SHIPPED | OUTPATIENT
Start: 2024-07-17 | End: 2024-08-16

## 2024-07-17 RX ORDER — LEVOTHYROXINE SODIUM 50 UG/1
50 TABLET ORAL
Status: DISCONTINUED | OUTPATIENT
Start: 2024-07-18 | End: 2024-07-18 | Stop reason: HOSPADM

## 2024-07-17 RX ORDER — FLUTICASONE PROPIONATE 50 MCG
1 SPRAY, SUSPENSION (ML) NASAL DAILY
Status: DISCONTINUED | OUTPATIENT
Start: 2024-07-18 | End: 2024-07-18 | Stop reason: HOSPADM

## 2024-07-17 RX ORDER — ENOXAPARIN SODIUM 100 MG/ML
40 INJECTION SUBCUTANEOUS NIGHTLY
Status: DISCONTINUED | OUTPATIENT
Start: 2024-07-17 | End: 2024-07-18 | Stop reason: HOSPADM

## 2024-07-17 RX ORDER — CETIRIZINE HYDROCHLORIDE 5 MG/1
10 TABLET ORAL DAILY
Status: DISCONTINUED | OUTPATIENT
Start: 2024-07-17 | End: 2024-07-17 | Stop reason: CLARIF

## 2024-07-17 RX ORDER — TRAMADOL HYDROCHLORIDE 50 MG/1
50 TABLET ORAL EVERY 8 HOURS PRN
Status: DISCONTINUED | OUTPATIENT
Start: 2024-07-17 | End: 2024-07-18 | Stop reason: HOSPADM

## 2024-07-17 RX ORDER — ASPIRIN 325 MG
325 TABLET ORAL ONCE
Status: COMPLETED | OUTPATIENT
Start: 2024-07-17 | End: 2024-07-17

## 2024-07-17 RX ORDER — AMLODIPINE BESYLATE 5 MG/1
5 TABLET ORAL DAILY
Status: DISCONTINUED | OUTPATIENT
Start: 2024-07-18 | End: 2024-07-18 | Stop reason: HOSPADM

## 2024-07-17 RX ORDER — MECLIZINE HYDROCHLORIDE 25 MG/1
25 TABLET ORAL ONCE
Status: COMPLETED | OUTPATIENT
Start: 2024-07-17 | End: 2024-07-17

## 2024-07-17 RX ORDER — ALPRAZOLAM 0.25 MG/1
0.25 TABLET ORAL 3 TIMES DAILY PRN
Status: DISCONTINUED | OUTPATIENT
Start: 2024-07-17 | End: 2024-07-18 | Stop reason: HOSPADM

## 2024-07-17 RX ORDER — ACETAMINOPHEN 325 MG/1
650 TABLET ORAL ONCE
Status: COMPLETED | OUTPATIENT
Start: 2024-07-17 | End: 2024-07-17

## 2024-07-17 RX ORDER — LORATADINE 10 MG/1
10 TABLET ORAL DAILY
Status: DISCONTINUED | OUTPATIENT
Start: 2024-07-18 | End: 2024-07-18 | Stop reason: HOSPADM

## 2024-07-17 RX ORDER — LORATADINE 10 MG/1
10 TABLET ORAL DAILY
Qty: 30 TABLET | Refills: 0 | Status: SHIPPED | OUTPATIENT
Start: 2024-07-17 | End: 2024-08-16

## 2024-07-17 RX ORDER — GADOTERATE MEGLUMINE 376.9 MG/ML
15 INJECTION INTRAVENOUS
Status: COMPLETED | OUTPATIENT
Start: 2024-07-17 | End: 2024-07-17

## 2024-07-17 RX ORDER — METOPROLOL SUCCINATE 50 MG/1
50 TABLET, EXTENDED RELEASE ORAL DAILY
Status: DISCONTINUED | OUTPATIENT
Start: 2024-07-18 | End: 2024-07-18 | Stop reason: HOSPADM

## 2024-07-17 SDOH — SOCIAL STABILITY: SOCIAL INSECURITY: HAVE YOU HAD THOUGHTS OF HARMING ANYONE ELSE?: NO

## 2024-07-17 SDOH — SOCIAL STABILITY: SOCIAL INSECURITY: HAS ANYONE EVER THREATENED TO HURT YOUR FAMILY OR YOUR PETS?: NO

## 2024-07-17 SDOH — SOCIAL STABILITY: SOCIAL INSECURITY: WERE YOU ABLE TO COMPLETE ALL THE BEHAVIORAL HEALTH SCREENINGS?: YES

## 2024-07-17 SDOH — SOCIAL STABILITY: SOCIAL INSECURITY: ARE THERE ANY APPARENT SIGNS OF INJURIES/BEHAVIORS THAT COULD BE RELATED TO ABUSE/NEGLECT?: NO

## 2024-07-17 SDOH — SOCIAL STABILITY: SOCIAL INSECURITY: DOES ANYONE TRY TO KEEP YOU FROM HAVING/CONTACTING OTHER FRIENDS OR DOING THINGS OUTSIDE YOUR HOME?: NO

## 2024-07-17 SDOH — SOCIAL STABILITY: SOCIAL INSECURITY: ARE YOU OR HAVE YOU BEEN THREATENED OR ABUSED PHYSICALLY, EMOTIONALLY, OR SEXUALLY BY ANYONE?: NO

## 2024-07-17 SDOH — SOCIAL STABILITY: SOCIAL INSECURITY: DO YOU FEEL UNSAFE GOING BACK TO THE PLACE WHERE YOU ARE LIVING?: NO

## 2024-07-17 SDOH — SOCIAL STABILITY: SOCIAL INSECURITY: ABUSE: ADULT

## 2024-07-17 SDOH — SOCIAL STABILITY: SOCIAL INSECURITY: HAVE YOU HAD ANY THOUGHTS OF HARMING ANYONE ELSE?: NO

## 2024-07-17 SDOH — SOCIAL STABILITY: SOCIAL INSECURITY: DO YOU FEEL ANYONE HAS EXPLOITED OR TAKEN ADVANTAGE OF YOU FINANCIALLY OR OF YOUR PERSONAL PROPERTY?: NO

## 2024-07-17 ASSESSMENT — PAIN - FUNCTIONAL ASSESSMENT
PAIN_FUNCTIONAL_ASSESSMENT: 0-10

## 2024-07-17 ASSESSMENT — COGNITIVE AND FUNCTIONAL STATUS - GENERAL
STANDING UP FROM CHAIR USING ARMS: A LITTLE
MOVING TO AND FROM BED TO CHAIR: A LITTLE
CLIMB 3 TO 5 STEPS WITH RAILING: A LITTLE
PATIENT BASELINE BEDBOUND: NO
MOBILITY SCORE: 23
DAILY ACTIVITIY SCORE: 24
MOBILITY SCORE: 18
TURNING FROM BACK TO SIDE WHILE IN FLAT BAD: A LITTLE
DAILY ACTIVITIY SCORE: 24
WALKING IN HOSPITAL ROOM: A LITTLE
CLIMB 3 TO 5 STEPS WITH RAILING: A LITTLE
MOVING FROM LYING ON BACK TO SITTING ON SIDE OF FLAT BED WITH BEDRAILS: A LITTLE

## 2024-07-17 ASSESSMENT — ACTIVITIES OF DAILY LIVING (ADL)
HEARING - RIGHT EAR: FUNCTIONAL
BATHING: INDEPENDENT
GROOMING: INDEPENDENT
TOILETING: INDEPENDENT
FEEDING YOURSELF: INDEPENDENT
ADEQUATE_TO_COMPLETE_ADL: YES
PATIENT'S MEMORY ADEQUATE TO SAFELY COMPLETE DAILY ACTIVITIES?: YES
JUDGMENT_ADEQUATE_SAFELY_COMPLETE_DAILY_ACTIVITIES: YES
WALKS IN HOME: INDEPENDENT
LACK_OF_TRANSPORTATION: NO
DRESSING YOURSELF: INDEPENDENT
HEARING - LEFT EAR: FUNCTIONAL

## 2024-07-17 ASSESSMENT — LIFESTYLE VARIABLES
SKIP TO QUESTIONS 9-10: 1
SUBSTANCE_ABUSE_PAST_12_MONTHS: NO
PRESCIPTION_ABUSE_PAST_12_MONTHS: NO
HOW MANY STANDARD DRINKS CONTAINING ALCOHOL DO YOU HAVE ON A TYPICAL DAY: PATIENT DOES NOT DRINK
HOW OFTEN DO YOU HAVE A DRINK CONTAINING ALCOHOL: NEVER
AUDIT-C TOTAL SCORE: 0
AUDIT-C TOTAL SCORE: 0
HOW OFTEN DO YOU HAVE 6 OR MORE DRINKS ON ONE OCCASION: NEVER

## 2024-07-17 ASSESSMENT — PATIENT HEALTH QUESTIONNAIRE - PHQ9
1. LITTLE INTEREST OR PLEASURE IN DOING THINGS: NOT AT ALL
SUM OF ALL RESPONSES TO PHQ9 QUESTIONS 1 AND 2: 0
2. FEELING DOWN, DEPRESSED OR HOPELESS: NOT AT ALL
SUM OF ALL RESPONSES TO PHQ9 QUESTIONS 1 & 2: 0
2. FEELING DOWN, DEPRESSED OR HOPELESS: NOT AT ALL
1. LITTLE INTEREST OR PLEASURE IN DOING THINGS: NOT AT ALL

## 2024-07-17 ASSESSMENT — ENCOUNTER SYMPTOMS
COUGH: 0
VOMITING: 0
RHINORRHEA: 1
DIZZINESS: 0
FEVER: 0
SHORTNESS OF BREATH: 0
CHILLS: 0
SINUS PRESSURE: 1
LIGHT-HEADEDNESS: 0
SORE THROAT: 0

## 2024-07-17 ASSESSMENT — PAIN SCALES - GENERAL
PAINLEVEL: 0-NO PAIN
PAINLEVEL_OUTOF10: 0 - NO PAIN

## 2024-07-17 ASSESSMENT — COLUMBIA-SUICIDE SEVERITY RATING SCALE - C-SSRS: 1. IN THE PAST MONTH, HAVE YOU WISHED YOU WERE DEAD OR WISHED YOU COULD GO TO SLEEP AND NOT WAKE UP?: NO

## 2024-07-17 NOTE — PROGRESS NOTES
Pt care accepted from Dr Parikh at 1500 with results of diagnostic imaging and repeat from the T pending.      The patient is an 84-year-old female presenting to the emergency department for evaluation of feeling off balance, a sensation that the room is spinning, a headache/pressure on the top of her head, and feeling very anxious.  She states that her symptoms started early this morning.  She states that she did not have the symptoms when she went to bed last night but she did have the symptoms when she woke up this morning.  She went to bed sometime around 2200 last night.  She states that she does have a history of vertigo so she did try to take her meclizine but it did not really help.  She states that she did go to her doctor's appointment today and they did try to clear out her ears but it only made her symptoms worse.  She states that they sent her to the emergency room by ambulance because of her symptoms.  She states that although she has had vertigo in the past she normally does not get a headache with it and her symptoms were more intense than she normally has.  No difficulty swallowing or speaking.  No visual changes.  No neck or back pain.  No chest pain or shortness of breath.  She was nauseated.  She denies any vomiting.  No diarrhea or constipation.  No urinary complaints.  No vaginal discharge.  No fever or chills.  No sick contacts or recent travel.  No recent injury or trauma.  All pertinent positives and negatives are recorded above.  All other systems reviewed and otherwise negative.  Vital signs with mild hypertension but otherwise within normal limits.  Physical exam with a well-nourished well-developed female in no acute distress.  HEENT exam with dry mucous membranes but otherwise unremarkable.  She has no evidence of airway compromise or respiratory distress.  Abdominal exam is benign.  She does not have any gross motor, neurologic or vascular deficits on exam.  NIH stroke scale score  of 0 at this time.      tPA/TNK is not indicated given last known well time of last night and a current NIH stroke scale score of 0.      EKG with sinus tachycardia 121 bpm, normal axis, normal voltage, normal ST segment, and a flattening of the T waves in the lateral leads      IV fluids, IV Zofran, oral acetaminophen, oral meclizine and oral aspirin ordered.      Diagnostic labs with a borderline Troponin T but otherwise unremarkable.      Initial troponin T of 20.  Repeat troponin T 23      CT head wo IV contrast   Final Result   No acute intracranial process.        Signed by: Marty Friend 7/17/2024 4:20 PM   Dictation workstation:   [id number]      XR chest 1 view   Final Result   Mild cardiomegaly.  Currently without radiographic evidence of CHF or   pneumonia.        MACRO:   None        Signed by: Leander Hutson 7/17/2024 3:56 PM   Dictation workstation:   [id number]           The patient does not have any evidence of airway compromise or respiratory distress on exam patient does not have any evidence of ischemia on EKG.  Her cardiac enzymes are mildly elevated at 20 and then 23 but she does have a history of similar values.  She did not have any events on telemetry.  She did not have any gross motor, neurologic or vascular deficits on exam.  She does report that she does have a history of vertigo but she states that this vertigo was worse and she normally does not have vertigo that is accompanied with a headache.  Her NIH stroke scale score is 0.  Her last known well time was last night making tPA/TNK not indicated.  Aspirin was ordered.  Meclizine and IV fluids also ordered with some improvement in her symptoms.  CT head without evidence of acute process.  No evidence of intracranial hemorrhage and/or CVA.  No mass effect.  Chest x-ray with mild cardiomegaly but no evidence of pneumonia or CHF.  No widening of the mediastinum.  She does have equal pulses bilaterally.      The patient was admitted for  further management of her vertigo and headache.      Impression/diagnosis  Vertigo, acute on chronic  Headache, acute  Near syncope  Nausea without vomiting      Critical care time of 16 minutes billed for assessment of the patient with the NIH stroke scale, consideration of tPA inclusion and exclusion criteria, initiation of oral aspirin, monitoring of the patient on telemetry, consultation with the patient regarding her results, consultation with accepting provider and arrangement for admission..  This time excludes time for billable procedures.      I independently reviewed the results of the EKG and diagnostic labs      I reviewed the results of the diagnostic labs and diagnostic imaging.  Formal radiology reading was completed by the radiologist

## 2024-07-17 NOTE — PROGRESS NOTES
Pharmacy Medication History Review    Caryn Agarwal is a 84 y.o. female admitted for Dizziness. Pharmacy reviewed the patient's [id number] medications and allergies for accuracy.    The list below reflectives the updated PTA list. Please review each medication in order reconciliation for additional clarification and justification.  Prior to Admission Medications   Prescriptions Last Dose Informant Patient Reported? Taking?   ALPRAZolam (Xanax) 0.25 mg tablet 7/17/2024 at am  No Yes   Sig: TAKE 1 TABLET BY MOUTH THREE TIMES DAILY AS NEEDED FOR ANXIETY   amLODIPine (Norvasc) 5 mg tablet 7/17/2024 at am  No Yes   Sig: Take 1 tablet (5 mg) by mouth once daily.   fluticasone (Flonase) 50 mcg/actuation nasal spray Past Week  No Yes   Sig: Administer 1 spray into each nostril once daily. Shake gently. Before first use, prime pump. After use, clean tip and replace cap.   levothyroxine (Synthroid, Levoxyl) 50 mcg tablet 7/17/2024 at am  No Yes   Sig: Take 1 tablet by mouth once daily   loratadine (Claritin) 10 mg tablet   No No   Sig: Take 1 tablet (10 mg) by mouth once daily.   metoprolol succinate XL (Toprol-XL) 50 mg 24 hr tablet 7/17/2024 at am  No Yes   Sig: Take 1 tablet by mouth once daily      Facility-Administered Medications: None          The list below reflectives the updated allergy list. Please review each documented allergy for additional clarification and justification.  Allergies  Reviewed by Dayana Cabrales CPhT on 7/17/2024        Severity Reactions Comments    Propoxyphene Medium Other, Hallucinations Hallucinations, hyper, change in mental status,    Sertraline Medium Insomnia             Below are additional concerns with the patient's PTA list.  - there are no additional concerns at this time     DAYANA CABRALES CPhT

## 2024-07-17 NOTE — ED PROVIDER NOTES
HPI   Chief Complaint   Patient presents with    Dizziness     At dr office and sudden onset dizziness and nausea     Nausea       Patient resents ED for acute onset dizziness while in kitchen looking down.  Experienced acute onset when waking up.  Notes history of BPPV.  Attempted to take her meclizine with minimal improvement in symptoms.  Presently does not endorse any symptoms but states symptoms are associated with head movement and postural changes.  Does not endorse any associated cardiopulmonary symptoms and has not appreciated any infectious symptoms to include fever/chills.  Does note mild head fullness at the top of her head but denies any significant severe headache or maximal onset and does not endorse it being the worst take of her life.  Denies any radiation of her headache/head fullness symptoms.  Does not appreciating vision changes to include loss of vision/diplopia/blurred vision.  Does not endorse any auditory changes to include ear fullness or tinnitus.  Notes no recent falls or head strikes.  Notes no appreciable neurodeficits of extremities.  Overall states she is thinking/talking/walking appropriately.  Denies any history of strokes or aneurysms.  Endorses eating and drinking appropriately.  Denies any other significant systemic/20 symptoms or complaints.              Patient History   Past Medical History:   Diagnosis Date    Encounter for screening mammogram for malignant neoplasm of breast     Visit for screening mammogram    Hypertension     Hypothyroid     Other conditions influencing health status     Chronic Gastric Ulcer With Hemorrhage    Personal history of other diseases of the digestive system     History of esophageal reflux     Past Surgical History:   Procedure Laterality Date    HERNIA REPAIR  04/15/2013    Hernia Repair    MR HEAD ANGIO W AND WO IV CONTRAST  1/4/2012    MR HEAD ANGIO W AND WO IV CONTRAST [facility]     Family History   Problem Relation Name Age of  Onset    Hypertension Mother      Heart attack Father           age 66    Cancer Daughter       Social History     Tobacco Use    Smoking status: Former     Current packs/day: 0.25     Average packs/day: 0.3 packs/day for 54.5 years (13.6 ttl pk-yrs)     Types: Cigarettes     Start date:      Passive exposure: Never    Smokeless tobacco: Never   Substance Use Topics    Alcohol use: Never    Drug use: Never       Physical Exam   ED Triage Vitals [24 1205]   Temperature Heart Rate Respirations BP   36.6 °C (97.8 °F) 76 16 159/62      Pulse Ox Temp Source Heart Rate Source Patient Position   98 % Oral Monitor --      BP Location FiO2 (%)     -- --       Physical Exam  Vitals and nursing note reviewed.   Constitutional:       General: She is not in acute distress.     Appearance: Normal appearance. She is normal weight. She is not ill-appearing.   HENT:      Nose: Nose normal.      Mouth/Throat:      Mouth: Mucous membranes are moist.      Pharynx: Oropharynx is clear.   Eyes:      General: No visual field deficit or scleral icterus.     Pupils: Pupils are equal, round, and reactive to light.   Cardiovascular:      Rate and Rhythm: Normal rate and regular rhythm.      Pulses:           Radial pulses are 2+ on the right side and 2+ on the left side.        Dorsalis pedis pulses are 2+ on the right side and 2+ on the left side.      Heart sounds: Normal heart sounds. No murmur heard.  Pulmonary:      Effort: Pulmonary effort is normal.      Breath sounds: Normal breath sounds and air entry.   Musculoskeletal:      Right lower leg: No edema.      Left lower leg: No edema.   Skin:     General: Skin is warm and dry.   Neurological:      General: No focal deficit present.      Mental Status: She is alert and oriented to person, place, and time.      Cranial Nerves: Cranial nerves 2-12 are intact. No cranial nerve deficit, dysarthria or facial asymmetry.      Sensory: Sensation is intact. No sensory deficit.       Motor: Motor function is intact. No weakness or abnormal muscle tone.      Coordination: Coordination is intact. Romberg sign negative. Coordination normal. Finger-Nose-Finger Test and Heel to Shin Test normal.      Gait: Gait is intact. Gait normal.      Comments: NIHSS 0, gross motor/strength sensation intact x 4, able to ambulate unassisted only appreciable instability/difficulty, negative Romberg sign, asymptomatic with postural change           ED Course & MDM   ED Course as of 07/19/24 2144   Wed Jul 17, 2024   1207 VSS on presentation in setting of reported dizziness and GI symptoms [BC]   1359 I personally reviewed and interpreted the EKG @1210: NSR 74, no appreciable ischemia, LAD, LAFB, prolonged Qtc 481, and prior EKG reviewed without any appreciable specific/identifiable changes. [BC]   1430 CBC and Auto Differential(!)  unremarkable and noncontributory to Patient condition/symptoms     [BC]   Thu Jul 18, 2024   1430 Comprehensive metabolic panel(!)  unremarkable and noncontributory to Patient condition/symptoms   [BC]      ED Course User Index  [BC] Diego Parikh MD         Diagnoses as of 07/19/24 2144   Dizziness   Pressure in head   Vertigo                       No data recorded                        Medical Decision Making  Patient presented to the ED for head positional dizziness with associated mild nausea with concerning PMHx of BPPV, A-fib, HTN, hypothyroidism,.  I personally reviewed and interpreted VS, labs, and images which are as stated above in the ED course.    Assessment/evaluation vertiginous symptoms likely consistent with BPPV.  No concerning history, clinical evidence/work-up, or exam findings for the concerning differentials of malignant cardiac arrhythmia, significant electrolyte/metabolic derangement, infectious symptoms/encephalopathy, nontraumatic ICH, ischemic CVA,.  These conditions have been thoroughly evaluated and determined to be sufficiently unlikely to be the  "etiology of patient's presenting symptoms.    Patient signed out to oncoming provider, Dr. Violeta May, at 3:41 PM in stable condition.    /72 (BP Location: Right arm, Patient Position: Sitting)   Pulse 66   Temp 36 °C (96.8 °F) (Temporal)   Resp 16   Ht 1.575 m (5' 2.01\")   Wt 46.2 kg (101 lb 13.6 oz)   SpO2 97%   BMI 18.62 kg/m²     Remaining workup:  Reassessment    Patient disposition Discharge Home and alternative disposition Medicine admission (observation.      Per Chart Review: Primary care office visit on 7/17/2024 for evaluation of acute rhinitis and endorsement of ear pressure, visit summary significant for bilateral ear pain, no reported dizziness/lightheadedness, VS mild to moderately hypertensive otherwise VSS, exam notable for bilateral impacted cerumen, no other concerns, routine follow-up.    Problems Addressed:  Dizziness: acute illness or injury  Pressure in head: self-limited or minor problem  Vertigo: acute illness or injury    Amount and/or Complexity of Data Reviewed  External Data Reviewed: notes.  Labs: ordered. Decision-making details documented in ED Course.  ECG/medicine tests: ordered and independent interpretation performed. Decision-making details documented in ED Course.        Procedure  Procedures     Diego Parikh MD  07/19/24 6321    "

## 2024-07-17 NOTE — NURSING NOTE
Patient has arrived to floor from ED in stable condition. Patient denies feeling dizzy at this time. Patient educated on usage and purpose of equipment.

## 2024-07-17 NOTE — H&P
History Of Present Illness  The patient is an 84-year-old female presenting to the emergency department for evaluation of feeling off balance, a sensation that the room is spinning, a headache/pressure on the top of her head, and feeling very anxious.  She states that her symptoms started early this morning.  She states that she did not have the symptoms when she went to bed last night but she did have the symptoms when she woke up this morning.  She went to bed sometime around 2200 last night.  She states that she does have a history of vertigo so she did try to take her meclizine but it did not really help.  She states that she did go to her doctor's appointment today and they did try to clear out her ears but it only made her symptoms worse.  She states that they sent her to the emergency room by ambulance because of her symptoms.  She states that although she has had vertigo in the past she normally does not get a headache with it and her symptoms were more intense than she normally has.  No difficulty swallowing or speaking.  No visual changes.  No neck or back pain.  No chest pain or shortness of breath.  She was nauseated.  She denies any vomiting.  No diarrhea or constipation.  No urinary complaints.  No vaginal discharge.  No fever or chills.  No sick contacts or recent travel.  No recent injury or trauma.  All pertinent positives and negatives are recorded above.  All other systems reviewed and otherwise negative.  Vital signs with mild hypertension but otherwise within normal limits.  Physical exam with a well-nourished well-developed female in no acute distress.  HEENT exam with dry mucous membranes but otherwise unremarkable.  She has no evidence of airway compromise or respiratory distress.  Abdominal exam is benign.  She does not have any gross motor, neurologic or vascular deficits on exam.  NIH stroke scale score of 0 at this time.      Past Medical History  She has a past medical history of  Encounter for screening mammogram for malignant neoplasm of breast, Hypertension, Hypothyroid, Other conditions influencing health status, and Personal history of other diseases of the digestive system.    Surgical History  She has a past surgical history that includes Hernia repair (04/15/2013) and MR angio head w and wo IV contrast (2012).     Social History  She reports that she has quit smoking. Her smoking use included cigarettes. She started smoking about 54 years ago. She has a 13.6 pack-year smoking history. She has never been exposed to tobacco smoke. She has never used smokeless tobacco. She reports that she does not drink alcohol and does not use drugs.    Family History  Family History   Problem Relation Name Age of Onset    Hypertension Mother      Heart attack Father           age 66    Cancer Daughter          Allergies  Propoxyphene and Sertraline    Review of Systems  As in history of present illness, otherwise negative    Physical Exam  Alert oriented x 3  Lungs clear to auscultation bilaterally  Heart regular rhythm  Abdomen soft nontender  Extremities no edema  CNS no focal deficit    Last Recorded Vitals  /64   Pulse 77   Temp 36.6 °C (97.8 °F) (Oral)   Resp 16   Wt 47.6 kg (105 lb)   SpO2 98%     Relevant Results  Results for orders placed or performed during the hospital encounter of 24 (from the past 24 hour(s))   CBC and Auto Differential   Result Value Ref Range    WBC 10.7 4.4 - 11.3 x10*3/uL    nRBC 0.0 0.0 - 0.0 /100 WBCs    RBC 4.54 4.00 - 5.20 x10*6/uL    Hemoglobin 13.8 12.0 - 16.0 g/dL    Hematocrit 42.0 36.0 - 46.0 %    MCV 93 80 - 100 fL    MCH 30.4 26.0 - 34.0 pg    MCHC 32.9 32.0 - 36.0 g/dL    RDW 12.8 11.5 - 14.5 %    Platelets 220 150 - 450 x10*3/uL    Neutrophils % 83.6 40.0 - 80.0 %    Immature Granulocytes %, Automated 0.4 0.0 - 0.9 %    Lymphocytes % 11.1 13.0 - 44.0 %    Monocytes % 3.9 2.0 - 10.0 %    Eosinophils % 0.3 0.0 - 6.0 %    Basophils %  0.7 0.0 - 2.0 %    Neutrophils Absolute 8.98 (H) 1.60 - 5.50 x10*3/uL    Immature Granulocytes Absolute, Automated 0.04 0.00 - 0.50 x10*3/uL    Lymphocytes Absolute 1.19 0.80 - 3.00 x10*3/uL    Monocytes Absolute 0.42 0.05 - 0.80 x10*3/uL    Eosinophils Absolute 0.03 0.00 - 0.40 x10*3/uL    Basophils Absolute 0.07 0.00 - 0.10 x10*3/uL   Comprehensive metabolic panel   Result Value Ref Range    Glucose 130 (H) 65 - 99 mg/dL    Sodium 137 133 - 145 mmol/L    Potassium 3.9 3.4 - 5.1 mmol/L    Chloride 101 97 - 107 mmol/L    Bicarbonate 25 24 - 31 mmol/L    Urea Nitrogen 17 8 - 25 mg/dL    Creatinine 0.80 0.40 - 1.60 mg/dL    eGFR 73 >60 mL/min/1.73m*2    Calcium 9.1 8.5 - 10.4 mg/dL    Albumin 4.5 3.5 - 5.0 g/dL    Alkaline Phosphatase 78 35 - 125 U/L    Total Protein 7.4 5.9 - 7.9 g/dL    AST 29 5 - 40 U/L    Bilirubin, Total 0.6 0.1 - 1.2 mg/dL    ALT 22 5 - 40 U/L    Anion Gap 11 <=19 mmol/L   Troponin T   Result Value Ref Range    Troponin T, High Sensitivity 20 (HH) <=14 ng/L   ECG 12 lead   Result Value Ref Range    Ventricular Rate 74 BPM    Atrial Rate 74 BPM    NE Interval 140 ms    QRS Duration 88 ms    QT Interval 434 ms    QTC Calculation(Bazett) 481 ms    P Axis 77 degrees    R Axis -57 degrees    T Axis 53 degrees    QRS Count 12 beats    Q Onset 212 ms    P Onset 142 ms    P Offset 175 ms    T Offset 429 ms    QTC Fredericia 465 ms   Troponin T   Result Value Ref Range    Troponin T, High Sensitivity 23 (HH) <=14 ng/L          Assessment/Plan   Vertigo and headache  Mild troponin elevation, nonspecific  Hypertension, hypothyroidism, anxiety     Plan:  Will obtain MRI of the brain with and without contrast  Antivert, pain medications  Check serial troponins  PT OT evaluation and treatment  DVT prophylaxis    Lucas Jernigan MD

## 2024-07-17 NOTE — PROGRESS NOTES
"Subjective   Patient ID: Caryn Agarwal is a 84 y.o. female who presents for Ear Pressure (Patient states it started a month ago and its every day /She states the left ear is worse then the right ear /She also has head pressure that is causing dizziness ).    Ear Fullness   There is pain in both (L>R) ears. This is a new problem. The current episode started 1 to 4 weeks ago. The problem occurs constantly. The problem has been gradually worsening. There has been no fever. Associated symptoms include rhinorrhea. Pertinent negatives include no coughing, ear discharge, sore throat or vomiting. She has tried NSAIDs for the symptoms. The treatment provided mild relief.   Hx vertigo, took dose of Meclizine this morning, did not relieve symptoms.     Review of Systems   Constitutional:  Negative for chills and fever.   HENT:  Positive for congestion, postnasal drip, rhinorrhea, sinus pressure and sneezing. Negative for ear discharge and sore throat.    Eyes:  Negative for visual disturbance.   Respiratory:  Negative for cough and shortness of breath.    Cardiovascular:  Negative for chest pain.   Gastrointestinal:  Positive for nausea. Negative for vomiting.   Neurological:  Negative for dizziness and light-headedness.       Objective   /82   Pulse 83   Temp 36.7 °C (98 °F)   Ht 1.575 m (5' 2\")   Wt 46.3 kg (102 lb)   SpO2 98%   BMI 18.66 kg/m²     Physical Exam  Vitals and nursing note reviewed.   Constitutional:       General: She is not in acute distress.  HENT:      Right Ear: Ear canal normal. There is impacted cerumen.      Left Ear: Ear canal normal. There is impacted cerumen.      Mouth/Throat:      Mouth: Mucous membranes are moist.   Eyes:      Extraocular Movements: Extraocular movements intact.      Conjunctiva/sclera: Conjunctivae normal.   Cardiovascular:      Rate and Rhythm: Normal rate and regular rhythm.   Pulmonary:      Effort: Pulmonary effort is normal.      Breath sounds: Normal breath " sounds.   Musculoskeletal:      Cervical back: Neck supple.   Neurological:      General: No focal deficit present.      Mental Status: She is alert.   Psychiatric:         Mood and Affect: Mood normal.       Assessment/Plan   Problem List Items Addressed This Visit    None  Visit Diagnoses         Codes    Acute rhinitis    -  Primary  Acute. Suspect eustachian tube dysfunction secondary to rhinitis, however unable to visualize TM.   Start Flonase and Claritin as directed. Risks and benefits of medication discussed and prescribed.  J00    Relevant Medications    fluticasone (Flonase) 50 mcg/actuation nasal spray    loratadine (Claritin) 10 mg tablet    Bilateral impacted cerumen    Acute. Recommend use of ear wax softening drops for 7 days prior to ear flushing. Patient states she would like ears flushed today. We discussed potential side effects including worsening of dizziness, nausea, lightheadedness. Patient understands and wishes to proceed with ear flushing. Right ear flushed, cerumen removed, tolerated well. Left ear flushed, unable to fully remove cerumen. Shortly after procedure, patient became nauseous, dizzy and began to vomit. Patient observed and ultimately decided she did not feel comfortable going home. EMS called and patient transported to ED, vitals WNL at time of transport, see flowsheet.    H61.23

## 2024-07-17 NOTE — CARE PLAN
The patient's goals for the shift include comfort and rest.    The clinical goals for the shift include cofmort, maintain safety, monitor dizziness, encourage mobility, complete MRI of brain, and monitor labs/VS.    Problem: Fall/Injury  Goal: Not fall by end of shift  Outcome: Progressing  Goal: Be free from injury by end of the shift  Outcome: Progressing  Goal: Verbalize understanding of personal risk factors for fall in the hospital  Outcome: Progressing  Goal: Verbalize understanding of risk factor reduction measures to prevent injury from fall in the home  Outcome: Progressing  Goal: Use assistive devices by end of the shift  Outcome: Progressing  Goal: Pace activities to prevent fatigue by end of the shift  Outcome: Progressing     Problem: Deep Vein Thrombosis  Goal: I will remain free from complications of deep vein thrombosis and maintain current level of mobility  Outcome: Progressing     Problem: Pain - Adult  Goal: Verbalizes/displays adequate comfort level or baseline comfort level  Outcome: Progressing     Problem: Safety - Adult  Goal: Free from fall injury  Outcome: Progressing     Problem: Chronic Conditions and Co-morbidities  Goal: Patient's chronic conditions and co-morbidity symptoms are monitored and maintained or improved  Outcome: Progressing     Problem: Discharge Planning  Goal: Discharge to home or other facility with appropriate resources  Outcome: Progressing

## 2024-07-18 VITALS
BODY MASS INDEX: 18.74 KG/M2 | RESPIRATION RATE: 16 BRPM | HEIGHT: 62 IN | SYSTOLIC BLOOD PRESSURE: 139 MMHG | TEMPERATURE: 96.8 F | DIASTOLIC BLOOD PRESSURE: 72 MMHG | OXYGEN SATURATION: 97 % | WEIGHT: 101.85 LBS | HEART RATE: 66 BPM

## 2024-07-18 LAB
ATRIAL RATE: 74 BPM
P AXIS: 77 DEGREES
P OFFSET: 175 MS
P ONSET: 142 MS
PR INTERVAL: 140 MS
Q ONSET: 212 MS
QRS COUNT: 12 BEATS
QRS DURATION: 88 MS
QT INTERVAL: 434 MS
QTC CALCULATION(BAZETT): 481 MS
QTC FREDERICIA: 465 MS
R AXIS: -57 DEGREES
T AXIS: 53 DEGREES
T OFFSET: 429 MS
VENTRICULAR RATE: 74 BPM

## 2024-07-18 PROCEDURE — 2500000001 HC RX 250 WO HCPCS SELF ADMINISTERED DRUGS (ALT 637 FOR MEDICARE OP): Performed by: FAMILY MEDICINE

## 2024-07-18 PROCEDURE — G0378 HOSPITAL OBSERVATION PER HR: HCPCS

## 2024-07-18 PROCEDURE — 2500000004 HC RX 250 GENERAL PHARMACY W/ HCPCS (ALT 636 FOR OP/ED)

## 2024-07-18 PROCEDURE — 97161 PT EVAL LOW COMPLEX 20 MIN: CPT | Mod: GP

## 2024-07-18 SDOH — HEALTH STABILITY: MENTAL HEALTH
STRESS IS WHEN SOMEONE FEELS TENSE, NERVOUS, ANXIOUS, OR CAN'T SLEEP AT NIGHT BECAUSE THEIR MIND IS TROUBLED. HOW STRESSED ARE YOU?: NOT AT ALL

## 2024-07-18 SDOH — SOCIAL STABILITY: SOCIAL INSECURITY
WITHIN THE LAST YEAR, HAVE YOU BEEN KICKED, HIT, SLAPPED, OR OTHERWISE PHYSICALLY HURT BY YOUR PARTNER OR EX-PARTNER?: NO

## 2024-07-18 SDOH — SOCIAL STABILITY: SOCIAL NETWORK: HOW OFTEN DO YOU ATTENT MEETINGS OF THE CLUB OR ORGANIZATION YOU BELONG TO?: PATIENT DECLINED

## 2024-07-18 SDOH — ECONOMIC STABILITY: FOOD INSECURITY: WITHIN THE PAST 12 MONTHS, THE FOOD YOU BOUGHT JUST DIDN'T LAST AND YOU DIDN'T HAVE MONEY TO GET MORE.: NEVER TRUE

## 2024-07-18 SDOH — SOCIAL STABILITY: SOCIAL NETWORK
DO YOU BELONG TO ANY CLUBS OR ORGANIZATIONS SUCH AS CHURCH GROUPS UNIONS, FRATERNAL OR ATHLETIC GROUPS, OR SCHOOL GROUPS?: PATIENT DECLINED

## 2024-07-18 SDOH — HEALTH STABILITY: PHYSICAL HEALTH: ON AVERAGE, HOW MANY MINUTES DO YOU ENGAGE IN EXERCISE AT THIS LEVEL?: 0 MIN

## 2024-07-18 SDOH — ECONOMIC STABILITY: FOOD INSECURITY: WITHIN THE PAST 12 MONTHS, YOU WORRIED THAT YOUR FOOD WOULD RUN OUT BEFORE YOU GOT MONEY TO BUY MORE.: NEVER TRUE

## 2024-07-18 SDOH — ECONOMIC STABILITY: INCOME INSECURITY: IN THE PAST 12 MONTHS, HAS THE ELECTRIC, GAS, OIL, OR WATER COMPANY THREATENED TO SHUT OFF SERVICE IN YOUR HOME?: NO

## 2024-07-18 SDOH — SOCIAL STABILITY: SOCIAL NETWORK: ARE YOU MARRIED, WIDOWED, DIVORCED, SEPARATED, NEVER MARRIED, OR LIVING WITH A PARTNER?: MARRIED

## 2024-07-18 SDOH — SOCIAL STABILITY: SOCIAL INSECURITY
WITHIN THE LAST YEAR, HAVE TO BEEN RAPED OR FORCED TO HAVE ANY KIND OF SEXUAL ACTIVITY BY YOUR PARTNER OR EX-PARTNER?: NO

## 2024-07-18 SDOH — SOCIAL STABILITY: SOCIAL INSECURITY: WITHIN THE LAST YEAR, HAVE YOU BEEN HUMILIATED OR EMOTIONALLY ABUSED IN OTHER WAYS BY YOUR PARTNER OR EX-PARTNER?: NO

## 2024-07-18 SDOH — SOCIAL STABILITY: SOCIAL INSECURITY: WITHIN THE LAST YEAR, HAVE YOU BEEN AFRAID OF YOUR PARTNER OR EX-PARTNER?: NO

## 2024-07-18 SDOH — HEALTH STABILITY: MENTAL HEALTH
HOW OFTEN DO YOU NEED TO HAVE SOMEONE HELP YOU WHEN YOU READ INSTRUCTIONS, PAMPHLETS, OR OTHER WRITTEN MATERIAL FROM YOUR DOCTOR OR PHARMACY?: NEVER

## 2024-07-18 SDOH — SOCIAL STABILITY: SOCIAL NETWORK: HOW OFTEN DO YOU GET TOGETHER WITH FRIENDS OR RELATIVES?: MORE THAN THREE TIMES A WEEK

## 2024-07-18 SDOH — HEALTH STABILITY: PHYSICAL HEALTH: ON AVERAGE, HOW MANY DAYS PER WEEK DO YOU ENGAGE IN MODERATE TO STRENUOUS EXERCISE (LIKE A BRISK WALK)?: 0 DAYS

## 2024-07-18 SDOH — SOCIAL STABILITY: SOCIAL NETWORK: HOW OFTEN DO YOU ATTEND CHURCH OR RELIGIOUS SERVICES?: PATIENT DECLINED

## 2024-07-18 SDOH — SOCIAL STABILITY: SOCIAL NETWORK
IN A TYPICAL WEEK, HOW MANY TIMES DO YOU TALK ON THE PHONE WITH FAMILY, FRIENDS, OR NEIGHBORS?: MORE THAN THREE TIMES A WEEK

## 2024-07-18 ASSESSMENT — COGNITIVE AND FUNCTIONAL STATUS - GENERAL: MOBILITY SCORE: 24

## 2024-07-18 ASSESSMENT — PAIN SCALES - GENERAL
PAINLEVEL_OUTOF10: 0 - NO PAIN
PAINLEVEL_OUTOF10: 0 - NO PAIN

## 2024-07-18 ASSESSMENT — ACTIVITIES OF DAILY LIVING (ADL): ADL_ASSISTANCE: INDEPENDENT

## 2024-07-18 ASSESSMENT — PAIN - FUNCTIONAL ASSESSMENT: PAIN_FUNCTIONAL_ASSESSMENT: 0-10

## 2024-07-18 NOTE — DISCHARGE SUMMARY
Discharge Diagnosis  Dizziness    Discharge Meds     Your medication list        CONTINUE taking these medications        Instructions Last Dose Given Next Dose Due   ALPRAZolam 0.25 mg tablet  Commonly known as: Xanax      TAKE 1 TABLET BY MOUTH THREE TIMES DAILY AS NEEDED FOR ANXIETY       amLODIPine 5 mg tablet  Commonly known as: Norvasc      Take 1 tablet (5 mg) by mouth once daily.       fluticasone 50 mcg/actuation nasal spray  Commonly known as: Flonase      Administer 1 spray into each nostril once daily. Shake gently. Before first use, prime pump. After use, clean tip and replace cap.       levothyroxine 50 mcg tablet  Commonly known as: Synthroid, Levoxyl      Take 1 tablet by mouth once daily       loratadine 10 mg tablet  Commonly known as: Claritin      Take 1 tablet (10 mg) by mouth once daily.       metoprolol succinate XL 50 mg 24 hr tablet  Commonly known as: Toprol-XL      Take 1 tablet by mouth once daily                Test Results Pending At Discharge  Pending Labs       No current pending labs.            Hospital Course   The patient is an 84-year-old female presenting to the emergency department for evaluation of feeling off balance, a sensation that the room is spinning, a headache/pressure on the top of her head, and feeling very anxious. She states that her symptoms started early this morning. She states that she did not have the symptoms when she went to bed last night but she did have the symptoms when she woke up this morning. She went to bed sometime around 2200 last night. She states that she does have a history of vertigo so she did try to take her meclizine but it did not really help. She states that she did go to her doctor's appointment today and they did try to clear out her ears but it only made her symptoms worse. She states that they sent her to the emergency room by ambulance because of her symptoms. She states that although she has had vertigo in the past she normally does  not get a headache with it and her symptoms were more intense than she normally has. No difficulty swallowing or speaking. No visual changes. No neck or back pain. No chest pain or shortness of breath. She was nauseated. She denies any vomiting. No diarrhea or constipation. No urinary complaints. No vaginal discharge. No fever or chills. No sick contacts or recent travel. No recent injury or trauma. All pertinent positives and negatives are recorded above. All other systems reviewed and otherwise negative. Vital signs with mild hypertension but otherwise within normal limits. Physical exam with a well-nourished well-developed female in no acute distress. HEENT exam with dry mucous membranes but otherwise unremarkable. She has no evidence of airway compromise or respiratory distress. Abdominal exam is benign. She does not have any gross motor, neurologic or vascular deficits on exam. NIH stroke scale score of 0 at this   MRI of the brain was unremarkable.  The patient's symptoms improved.  She wants to go home.  She is discharged in stable condition with close follow-up with her primary care physician and ENT    Pertinent Physical Exam At Time of Discharge  Physical Exam  Alert oriented x 3  Lungs clear to auscultation bilaterally  Heart regular rhythm  Abdomen soft nontender  Extremities no edema  CNS no focal deficit    Outpatient Follow-Up  Future Appointments   Date Time Provider Department Center   10/21/2024  1:00 PM KATHY Reich-CNP [id number] [facility]         Lucas Jernigan MD

## 2024-07-18 NOTE — CARE PLAN
The patient's goals for the shift include comfort and rest, safety, discharge home.     The clinical goals for the shift include monitor labs and VS, manage dizziness, encourage activity, maintain safety, no falls, promote rest.    No barriers, patient discharged.       Problem: Fall/Injury  Goal: Not fall by end of shift  Outcome: Adequate for Discharge  Goal: Be free from injury by end of the shift  Outcome: Adequate for Discharge  Goal: Verbalize understanding of personal risk factors for fall in the hospital  Outcome: Adequate for Discharge  Goal: Verbalize understanding of risk factor reduction measures to prevent injury from fall in the home  Outcome: Adequate for Discharge  Goal: Use assistive devices by end of the shift  Outcome: Adequate for Discharge  Goal: Pace activities to prevent fatigue by end of the shift  Outcome: Adequate for Discharge     Problem: Deep Vein Thrombosis  Goal: I will remain free from complications of deep vein thrombosis and maintain current level of mobility  Outcome: Adequate for Discharge     Problem: Pain - Adult  Goal: Verbalizes/displays adequate comfort level or baseline comfort level  Outcome: Adequate for Discharge     Problem: Safety - Adult  Goal: Free from fall injury  Outcome: Adequate for Discharge     Problem: Discharge Planning  Goal: Discharge to home or other facility with appropriate resources  Outcome: Adequate for Discharge     Problem: Chronic Conditions and Co-morbidities  Goal: Patient's chronic conditions and co-morbidity symptoms are monitored and maintained or improved  Outcome: Adequate for Discharge

## 2024-07-18 NOTE — PROGRESS NOTES
07/18/24 1031   Discharge Planning   Living Arrangements Spouse/significant other   Support Systems Spouse/significant other   Assistance Needed independent   Type of Residence Private residence   Number of Stairs to Enter Residence 4   Number of Stairs Within Residence 0   Do you have animals or pets at home? No   Who is requesting discharge planning? Provider   Home or Post Acute Services None   Expected Discharge Disposition Home   Does the patient need discharge transport arranged? No     Home no needs.

## 2024-07-18 NOTE — PROGRESS NOTES
"Physical Therapy    Physical Therapy Evaluation    Patient Name: Caryn Agarwal  MRN: [id number]  Today's Date: 7/18/2024   Time Calculation  Start Time: 0737  Stop Time: 0749  Time Calculation (min): 12 min    Assessment/Plan   PT Assessment  Barriers to Discharge: none  Medical Staff Made Aware: Yes  End of Session Communication: Bedside nurse  End of Session Patient Position: Up in chair, Alarm off, not on at start of session    Assessment Comment: 83 y/o female who presented to ED with dizziness. Pt reported, \"I guess I need to just take my medicine even if it makes me feel sick.\" Pt independent at baseline; does not use AD for ambulation. Is currently presenting at/near functional baseline. Amb zaman without AD and navigated stairs without concerns. Pt denying any physical concerns with dc home. Anticpate pt will return home and function without concerns; pt agreeable. No further PT needs.        IP OR SWING BED PT PLAN  Inpatient or Swing Bed: Inpatient  PT Plan  PT Plan: PT Eval only  PT Eval Only Reason: No acute PT needs identified  PT Frequency: PT eval only  PT Discharge Recommendations:  (family assist as needed)  PT Recommended Transfer Status: Stand by assist  PT - OK to Discharge: Yes      Subjective   General Visit Information:  General  Reason for Referral: impaired mobility  Past Medical History Relevant to Rehab: malignant neoplasm of breast, Hypertension, Hypothyroid  Missed Visit: No  Family/Caregiver Present: No  Prior to Session Communication: Bedside nurse  Patient Position Received: Bed, 2 rail up, Alarm off, not on at start of session  Preferred Learning Style: verbal, visual  General Comment: 83 y/o female who presented to ED with balance and room spinning. Pt sitting on EOB upon PT arrival; agreeable to participate. \"I'm hoping to go home today.\"  Home Living:  Home Living  Type of Home: Mobile home  Lives With: Spouse  Home Layout: One level  Home Access: Stairs to enter with rails  Entrance " "Stairs-Number of Steps: 4  with bilateral handrails  Home Living Comments: Single story living  Prior Level of Function:  Prior Function Per Pt/Caregiver Report  Level of [place]: Independent with ADLs and functional transfers, Independent with homemaking with ambulation  Receives Help From: Family  ADL Assistance: Independent  Homemaking Assistance: Independent  Ambulatory Assistance: Independent  Leisure: walk \"6 miles a day,\" and golf \"3 times/wk.\"  Prior Function Comments: independent  Precautions:  Precautions  Medical Precautions: Fall precautions  Vital Signs:       Objective   Pain:  Pain Assessment  0-10 (Numeric) Pain Score: 0 - No pain  Cognition:  Cognition  Overall Cognitive Status: Within Functional Limits    General Assessments:  General Observation  General Observation: pleasant/cooperative     Activity Tolerance  Endurance: Endurance does not limit participation in activity    Sensation  Light Touch: No apparent deficits    Strength  Strength Comments: no funcitonal strength deficits observed with mobility       Static Sitting Balance  Static Sitting-Level of Assistance: Independent  Dynamic Sitting Balance  Dynamic Sitting-Comments: no LOB    Static Standing Balance  Static Standing-Level of Assistance: Independent  Dynamic Standing Balance  Dynamic Standing-Comments: able to complete head turns during ambulation. Able to manuever around obstacles without LOB.  Functional Assessments:       Transfers  Transfer: Yes  Transfer 1  Technique 1: Sit to stand, Stand to sit  Transfer Level of Assistance 1: Independent  Trials/Comments 1: from elevated EOB onto chair with arms    Ambulation/Gait Training  Ambulation/Gait Training Performed: Yes  Ambulation/Gait Training 1  Surface 1: Level tile  Device 1: No device  Assistance 1: Distant supervision  Quality of Gait 1:  (no major gait deviations observed)  Comments/Distance (ft) 1: about 150 ft x 2 trials with stair navigation in " between    Stairs  Stairs: Yes  Stairs  Rails 1: Bilateral  Assistance 1: Modified independent  Comment/Number of Steps 1: up/down 4 steps wtih B handrails, reciprocal gait pattern. No major LOB or deviations.  Extremity/Trunk Assessments:  RLE   RLE : Within Functional Limits  LLE   LLE : Within Functional Limits  Outcome Measures:  [facility] Basic Mobility  Turning from your back to your side while in a flat bed without using bedrails: None  Moving from lying on your back to sitting on the side of a flat bed without using bedrails: None  Moving to and from bed to chair (including a wheelchair): None  Standing up from a chair using your arms (e.g. wheelchair or bedside chair): None  To walk in hospital room: None  Climbing 3-5 steps with railing: None  Basic Mobility - Total Score: 24    Encounter Problems       Encounter Problems (Active)       Pain - Adult              Education Documentation  Handouts, taught by Tammy Rocha PT at 7/18/2024  9:04 AM.  Learner: Patient  Readiness: Acceptance  Method: Explanation  Response: Verbalizes Understanding    Precautions, taught by Tammy Rocha PT at 7/18/2024  9:04 AM.  Learner: Patient  Readiness: Acceptance  Method: Explanation  Response: Verbalizes Understanding    Body Mechanics, taught by Tammy Rocha PT at 7/18/2024  9:04 AM.  Learner: Patient  Readiness: Acceptance  Method: Explanation  Response: Verbalizes Understanding    Home Exercise Program, taught by Tammy Rocha PT at 7/18/2024  9:04 AM.  Learner: Patient  Readiness: Acceptance  Method: Explanation  Response: Verbalizes Understanding    Mobility Training, taught by Tammy Rocha PT at 7/18/2024  9:04 AM.  Learner: Patient  Readiness: Acceptance  Method: Explanation  Response: Verbalizes Understanding    Education Comments  No comments found.

## 2024-07-18 NOTE — PROGRESS NOTES
Evaluation    Patient Name: Caryn Agarwal  MRN: [id number]  Today's Date: 7/18/2024    General:  General  Missed Visit: Yes  Missed Visit Reason:  (Patient screened - independent with ADLs, transfers and mobility.  No needs.)     You can access the [facility] Portal, offered by [facility], by registering with the following website: [email or web address]

## 2024-07-18 NOTE — CARE PLAN
The patient's goals for the shift include comfort and rest    The clinical goals for the shift include comfort, safety, monitor VS, promote rest.       07/17/24 at 9:58 PM - Dana Christianson RN

## 2024-07-19 ENCOUNTER — PATIENT OUTREACH (OUTPATIENT)
Dept: PRIMARY CARE | Facility: CLINIC | Age: 84
End: 2024-07-19
Payer: COMMERCIAL

## 2024-07-19 NOTE — PROGRESS NOTES
Discharge Facility: [facility] [place]  Discharge Diagnosis: Dizziness; Vertigo  Admission Date: 7/17/2024  Discharge Date: 7/18/2024    PCP Appointment Date: 7/24/2024  Specialist Appointment Date: TBD with ENT  Hospital Encounter and Summary Linked: Yes  See discharge assessment below for further details  Engagement  Call Start Time: 1037 (7/19/2024 10:45 AM)    Medications  Medications reviewed with patient/caregiver?: Yes (no new meds/changes per patient) (7/19/2024 10:45 AM)  Is the patient having any side effects they believe may be caused by any medication additions or changes?: No (7/19/2024 10:45 AM)  Does the patient have all medications ordered at discharge?: Yes (7/19/2024 10:45 AM)  Is the patient taking all medications as directed (includes completed medication regime)?: Yes (7/19/2024 10:45 AM)  Care Management Interventions: Provided patient education (7/19/2024 10:45 AM)    Appointments  Does the patient have a primary care provider?: Yes (7/19/2024 10:45 AM)  Care Management Interventions: Educated patient on importance of making appointment; Advised patient to make appointment; Verified appointment date/time/provider (7/19/2024 10:45 AM)  Has the patient kept scheduled appointments due by today?: Yes (7/19/2024 10:45 AM)  Care Management Interventions: Advised patient to keep appointment (7/19/2024 10:45 AM)    Self Management  What is the home health agency?: denies need (7/19/2024 10:45 AM)  What Durable Medical Equipment (DME) was ordered?: n/a (7/19/2024 10:45 AM)    Patient Teaching  Does the patient have access to their discharge instructions?: Yes (7/19/2024 10:45 AM)  Care Management Interventions: Reviewed instructions with patient (7/19/2024 10:45 AM)  What is the patient's perception of their health status since discharge?: Improving (7/19/2024 10:45 AM)  Is the patient/caregiver able to teach back the hierarchy of who to call/visit for symptoms/problems? PCP, Specialist, Home  Health nurse, Urgent Care, ED, 911: Yes (7/19/2024 10:45 AM)  Patient/Caregiver Education Comments: Successful transition of care outreach with patient. Patient reports doing well at home since discharge.No new meds/changes noted with patient during outreach. Patient denies CP/SOB. Denies lightheadedness/dizziness. Patient denies further discharge questions/concerns/needs at time of outreach call. Emphasized that follow up appts are needed after discharge with PCP and reviewed needed follow ups with any specialties to assess response to treatment from hospitalization. Patient aware of my availability for non-emergent concerns. Contact information provided to the patient. (7/19/2024 10:45 AM)

## 2024-07-21 ASSESSMENT — ENCOUNTER SYMPTOMS: NAUSEA: 1

## 2024-07-24 ENCOUNTER — APPOINTMENT (OUTPATIENT)
Dept: PRIMARY CARE | Facility: CLINIC | Age: 84
End: 2024-07-24
Payer: COMMERCIAL

## 2024-07-24 VITALS
DIASTOLIC BLOOD PRESSURE: 70 MMHG | SYSTOLIC BLOOD PRESSURE: 124 MMHG | OXYGEN SATURATION: 98 % | HEART RATE: 70 BPM | BODY MASS INDEX: 18.95 KG/M2 | WEIGHT: 103 LBS | HEIGHT: 62 IN

## 2024-07-24 DIAGNOSIS — R42 VERTIGO: Primary | ICD-10-CM

## 2024-07-24 PROCEDURE — 99495 TRANSJ CARE MGMT MOD F2F 14D: CPT | Performed by: FAMILY MEDICINE

## 2024-07-24 PROCEDURE — 1036F TOBACCO NON-USER: CPT | Performed by: FAMILY MEDICINE

## 2024-07-24 PROCEDURE — 3078F DIAST BP <80 MM HG: CPT | Performed by: FAMILY MEDICINE

## 2024-07-24 PROCEDURE — 1159F MED LIST DOCD IN RCRD: CPT | Performed by: FAMILY MEDICINE

## 2024-07-24 PROCEDURE — 1126F AMNT PAIN NOTED NONE PRSNT: CPT | Performed by: FAMILY MEDICINE

## 2024-07-24 PROCEDURE — 1123F ACP DISCUSS/DSCN MKR DOCD: CPT | Performed by: FAMILY MEDICINE

## 2024-07-24 PROCEDURE — 3074F SYST BP LT 130 MM HG: CPT | Performed by: FAMILY MEDICINE

## 2024-07-24 ASSESSMENT — PATIENT HEALTH QUESTIONNAIRE - PHQ9
SUM OF ALL RESPONSES TO PHQ9 QUESTIONS 1 AND 2: 0
1. LITTLE INTEREST OR PLEASURE IN DOING THINGS: NOT AT ALL
2. FEELING DOWN, DEPRESSED OR HOPELESS: NOT AT ALL

## 2024-07-24 ASSESSMENT — PAIN SCALES - GENERAL: PAINLEVEL: 0-NO PAIN

## 2024-07-24 NOTE — PROGRESS NOTES
"Subjective   Patient ID: Caryn Agarwal is a 84 y.o. female who presents for Follow-up (TCM  [facility] 7/17/2024 discharged 7/18/2024 -for feeling  dizzy after earflush.  No episodes since).    HPI here for follow-up to hospitalization on 7/17 through 7/18/24 for vertigo.  On the date of her admission patient was seen in this office after ears cleared of cerumen.  She was part way through the cerumen removal when she developed exquisite nausea with vomiting due to her dizziness.  She had been in the midst of a vertigo attack that she gets approximately once a year.  She did not think that the ear irrigation would cause a problem.  However it caused significant problem with nausea and vomiting.  Patient was sent to the ED where she was admitted overnight and hydrated.  Both CT and MRI scans of the head were unremarkable.  Patient returns today back to her baseline state of health with her vertigo spell having been resolved.    Review of Systems  Constitutional: Patient is negative for fever, fatigue, weight change.  HEENT: Patient is negative change in vision, hearing, swallow.  Cardio: Patient is negative for chest pain, lower extremity edema.  Pulmonary: Patient is negative for cough, shortness of breath.  Objective   /70 (BP Location: Left arm, Patient Position: Sitting, BP Cuff Size: Adult)   Pulse 70   Ht 1.575 m (5' 2\")   Wt 46.7 kg (103 lb)   SpO2 98%   BMI 18.84 kg/m²     Physical Exam  General: Awake and alert no apparent distress.  HEENT: Moist oral mucosa no cervical lymphadenopathy.  Cardio: Heart S1-S2 no murmur rub or gallop.  Pulmonary: Lungs clear to auscultation bilaterally.  Assessment/Plan   Problem List Items Addressed This Visit             ICD-10-CM    Vertigo - Primary   in remission.  Patient will follow-up as needed. R42          "

## 2024-07-26 ENCOUNTER — PATIENT OUTREACH (OUTPATIENT)
Dept: CARDIOLOGY | Facility: CLINIC | Age: 84
End: 2024-07-26
Payer: COMMERCIAL

## 2024-07-26 NOTE — PROGRESS NOTES
Call regarding appt. with PCP on (7/24/2024) after hospitalization.  At time of outreach call the patient feels as if their condition has (returned to baseline) since last visit. Patient states she has been gardening and golfing with no issues. Does not see a need for any PT as this time and has decided to refrain from using the PT referral that was placed during her hospitalization.  Reviewed the PCP appointment with the pt and addressed any questions or concerns.

## 2024-10-02 ENCOUNTER — CLINICAL SUPPORT (OUTPATIENT)
Dept: PRIMARY CARE | Facility: CLINIC | Age: 84
End: 2024-10-02
Payer: COMMERCIAL

## 2024-10-02 VITALS
SYSTOLIC BLOOD PRESSURE: 121 MMHG | HEIGHT: 62 IN | OXYGEN SATURATION: 97 % | DIASTOLIC BLOOD PRESSURE: 63 MMHG | HEART RATE: 65 BPM | WEIGHT: 103 LBS | BODY MASS INDEX: 18.95 KG/M2 | TEMPERATURE: 97.3 F

## 2024-10-02 DIAGNOSIS — Z23 IMMUNIZATION DUE: Primary | ICD-10-CM

## 2024-10-02 PROCEDURE — G0008 ADMIN INFLUENZA VIRUS VAC: HCPCS | Performed by: FAMILY MEDICINE

## 2024-10-02 PROCEDURE — 90662 IIV NO PRSV INCREASED AG IM: CPT | Performed by: FAMILY MEDICINE

## 2024-10-21 ENCOUNTER — OFFICE VISIT (OUTPATIENT)
Dept: CARDIOLOGY | Facility: CLINIC | Age: 84
End: 2024-10-21
Payer: COMMERCIAL

## 2024-10-21 VITALS
DIASTOLIC BLOOD PRESSURE: 67 MMHG | OXYGEN SATURATION: 97 % | BODY MASS INDEX: 19.27 KG/M2 | SYSTOLIC BLOOD PRESSURE: 148 MMHG | HEIGHT: 62 IN | WEIGHT: 104.7 LBS | HEART RATE: 71 BPM

## 2024-10-21 DIAGNOSIS — E03.9 HYPOTHYROIDISM, UNSPECIFIED TYPE: ICD-10-CM

## 2024-10-21 DIAGNOSIS — I10 HYPERTENSION, UNSPECIFIED TYPE: ICD-10-CM

## 2024-10-21 DIAGNOSIS — I48.91 ATRIAL FIBRILLATION, UNSPECIFIED TYPE (MULTI): Primary | ICD-10-CM

## 2024-10-21 PROCEDURE — 1126F AMNT PAIN NOTED NONE PRSNT: CPT | Performed by: NURSE PRACTITIONER

## 2024-10-21 PROCEDURE — 1036F TOBACCO NON-USER: CPT | Performed by: NURSE PRACTITIONER

## 2024-10-21 PROCEDURE — 3077F SYST BP >= 140 MM HG: CPT | Performed by: NURSE PRACTITIONER

## 2024-10-21 PROCEDURE — 99214 OFFICE O/P EST MOD 30 MIN: CPT | Performed by: NURSE PRACTITIONER

## 2024-10-21 PROCEDURE — 1159F MED LIST DOCD IN RCRD: CPT | Performed by: NURSE PRACTITIONER

## 2024-10-21 PROCEDURE — 3078F DIAST BP <80 MM HG: CPT | Performed by: NURSE PRACTITIONER

## 2024-10-21 PROCEDURE — 1123F ACP DISCUSS/DSCN MKR DOCD: CPT | Performed by: NURSE PRACTITIONER

## 2024-10-21 ASSESSMENT — ENCOUNTER SYMPTOMS
CONSTITUTIONAL NEGATIVE: 1
GASTROINTESTINAL NEGATIVE: 1
NEUROLOGICAL NEGATIVE: 1
EYES NEGATIVE: 1
RESPIRATORY NEGATIVE: 1
CARDIOVASCULAR NEGATIVE: 1

## 2024-10-21 ASSESSMENT — COLUMBIA-SUICIDE SEVERITY RATING SCALE - C-SSRS
6. HAVE YOU EVER DONE ANYTHING, STARTED TO DO ANYTHING, OR PREPARED TO DO ANYTHING TO END YOUR LIFE?: NO
1. IN THE PAST MONTH, HAVE YOU WISHED YOU WERE DEAD OR WISHED YOU COULD GO TO SLEEP AND NOT WAKE UP?: NO
2. HAVE YOU ACTUALLY HAD ANY THOUGHTS OF KILLING YOURSELF?: NO

## 2024-10-21 ASSESSMENT — PAIN SCALES - GENERAL: PAINLEVEL_OUTOF10: 0-NO PAIN

## 2024-10-21 NOTE — PROGRESS NOTES
"Chief Complaint:   Follow-up (Here for follow up visit.  Denies any chest discomfort or shortness of breath. )    History Of Present Illness:    .Ms. Agarwal presents to clinic for a follow-up visit. She states she is feeling well and has no complaints. She denies chest pain, SOB, palpitations, syncope, and leg swelling.         Last Recorded Vitals:  Blood pressure 148/67, pulse 71, height 1.575 m (5' 2\"), weight 47.5 kg (104 lb 11.2 oz), SpO2 97%.     Past Medical History:  Past Medical History:   Diagnosis Date    Encounter for screening mammogram for malignant neoplasm of breast     Visit for screening mammogram    Hypertension     Hypothyroid     Other conditions influencing health status     Chronic Gastric Ulcer With Hemorrhage    Personal history of other diseases of the digestive system     History of esophageal reflux        Past Surgical History:  Past Surgical History:   Procedure Laterality Date    HERNIA REPAIR  04/15/2013    Hernia Repair    MR HEAD ANGIO W AND WO IV CONTRAST  1/4/2012    MR HEAD ANGIO W AND WO IV CONTRAST [facility]       Social History:  Social History     Socioeconomic History    Marital status:    Tobacco Use    Smoking status: Former     Current packs/day: 0.25     Average packs/day: 0.3 packs/day for 54.8 years (13.7 ttl pk-yrs)     Types: Cigarettes     Start date: 1970     Passive exposure: Never    Smokeless tobacco: Never   Substance and Sexual Activity    Alcohol use: Never    Drug use: Never     Social Drivers of Health     Financial Resource Strain: Medium Risk (7/17/2024)    Overall Financial Resource Strain (CARDIA)     Difficulty of Paying Living Expenses: Somewhat hard   Food Insecurity: No Food Insecurity (7/18/2024)    Hunger Vital Sign     Worried About Running Out of Food in the Last Year: Never true     Ran Out of Food in the Last Year: Never true   Transportation Needs: No Transportation Needs (7/17/2024)    PRAPARE - Transportation     Lack of " Transportation (Medical): No     Lack of Transportation (Non-Medical): No   Physical Activity: Inactive (2024)    Exercise Vital Sign     Days of Exercise per Week: 0 days     Minutes of Exercise per Session: 0 min   Stress: No Stress Concern Present (2024)    [employer] [employer] - Occupational Stress Questionnaire     Feeling of Stress : Not at all   Social Connections: Unknown (2024)    Social Connection and Isolation Panel [NHANES]     Frequency of Communication with Friends and Family: More than three times a week     Frequency of Social Gatherings with Friends and Family: More than three times a week     Attends Cheondoism Services: Patient declined     Active Member of Clubs or Organizations: Patient declined     Attends Club or Organization Meetings: Patient declined     Marital Status:    Intimate Partner Violence: Not At Risk (2024)    Humiliation, Afraid, Rape, and Kick questionnaire     Fear of Current or Ex-Partner: No     Emotionally Abused: No     Physically Abused: No     Sexually Abused: No   Housing Stability: Low Risk  (2024)    Housing Stability Vital Sign     Unable to Pay for Housing in the Last Year: No     Number of Times Moved in the Last Year: 1     Homeless in the Last Year: No       Family History:  Family History   Problem Relation Name Age of Onset    Hypertension Mother      Heart attack Father           age 66    Cancer Daughter           Allergies:  Propoxyphene and Sertraline    Outpatient Medications:  Current Outpatient Medications   Medication Sig Dispense Refill    ALPRAZolam (Xanax) 0.25 mg tablet TAKE 1 TABLET BY MOUTH THREE TIMES DAILY AS NEEDED FOR ANXIETY 90 tablet 0    amLODIPine (Norvasc) 5 mg tablet Take 1 tablet (5 mg) by mouth once daily. 90 tablet 3    fluticasone (Flonase) 50 mcg/actuation nasal spray Administer 1 spray into each nostril once daily. Shake gently. Before first use, prime pump. After use, clean tip  and replace cap. 16 g 0    levothyroxine (Synthroid, Levoxyl) 50 mcg tablet Take 1 tablet by mouth once daily 90 tablet 3    metoprolol succinate XL (Toprol-XL) 50 mg 24 hr tablet Take 1 tablet by mouth once daily 90 tablet 3    loratadine (Claritin) 10 mg tablet Take 1 tablet (10 mg) by mouth once daily. 30 tablet 0     No current facility-administered medications for this visit.        Physical Exam:  Cardiovascular:      PMI at left midclavicular line. Normal rate. Regular rhythm. Normal S1. Normal S2.       Murmurs: There is no murmur.      No gallop.  No click. No rub.   Pulses:     Intact distal pulses.   Edema:     Peripheral edema absent.         ROS:  Review of Systems   Constitutional: Negative.   HENT: Negative.     Eyes: Negative.    Cardiovascular: Negative.    Respiratory: Negative.     Gastrointestinal: Negative.    Genitourinary: Negative.    Neurological: Negative.           Last Labs:  CBC -  Lab Results   Component Value Date    WBC 10.7 07/17/2024    HGB 13.8 07/17/2024    HCT 42.0 07/17/2024    MCV 93 07/17/2024     07/17/2024       CMP -  Lab Results   Component Value Date    CALCIUM 9.1 07/17/2024    PROT 7.4 07/17/2024    ALBUMIN 4.5 07/17/2024    AST 29 07/17/2024    ALT 22 07/17/2024    ALKPHOS 78 07/17/2024    BILITOT 0.6 07/17/2024       LIPID PANEL -   Lab Results   Component Value Date    CHOL 154 04/10/2024    TRIG 73 04/10/2024    HDL 54.0 04/10/2024    CHHDL 2.9 04/10/2024       RENAL FUNCTION PANEL -   Lab Results   Component Value Date    GLUCOSE 130 (H) 07/17/2024     07/17/2024    K 3.9 07/17/2024     07/17/2024    CO2 25 07/17/2024    ANIONGAP 11 07/17/2024    BUN 17 07/17/2024    CREATININE 0.80 07/17/2024    CALCIUM 9.1 07/17/2024    ALBUMIN 4.5 07/17/2024        Lab Results   Component Value Date    HGBA1C 5.3 04/10/2024         Assessment/Plan     Paroxysmal atrial fibrillation- This patient is a quite pleasant 83-year-old white female whose past medical  history is notable for hypertension treated with Toprol-XL 50 mg daily and amlodipine 5 mg daily.  Patient does have hypothyroidism with Synthroid replacement 50 mcg daily.  She denies any history of diabetes or hyperlipidemia.  She was a very remote smoker and quit at the age of about 41.  Her only operative procedure has been a left carpal tunnel release and hernia repair.  The patient is relatively active walks approximately 5 miles per day and golfs regularly in a league.     Patient was in usual state of health.  She went to a doctor's appointment with her  who has had eczema and was seeing an allergist.  Patient was stressed about the doctor's appointment.  Upon returning home her head felt like it would explode but not like a traditional headache.  She felt nauseated and vomited several times.  She checked her blood pressure which was elevated as well as the heart rate.  Her  advised that she come to the emergency room.     Initial evaluation in the emergency room included a chest x-ray which was clear with some findings suggestive of COPD.  The CBC and differential were normal.  Comprehensive metabolic panel was normal other than for glucose of 146.  High-sensitivity troponins were 22 repeated at 21.  The basic metabolic panel from today is in normal range with glucose of 102.  TSH was 1.73.  Patient was noted to be in atrial fibrillation with a rapid ventricular rate.  She was started on IV diltiazem infusion along with low intensity IV heparin infusion.  Her Toprol-XL was increased from 50 mg daily to twice daily.  The patient converted back to sinus rhythm at approximately 10:30 PM last evening.  She just had an echocardiogram completed reviewed preliminary basis.  This demonstrates an LV ejection fraction that is preserved at 60%.  She does have mild late systolic mitral valve prolapse with 1-2+ mitral valve insufficiency normal left atrial size no visible thrombus.  She also has some mild  age-related aortic valve sclerosis.     4/24: This patient is a very pleasant intact 83-year-old white female with a history of hypertension treated with metoprolol and amlodipine, hypothyroidism requiring levothyroxine 50 mcg daily, remote smoking. Patient was feeling well until last today afternoon when she developed nausea vomiting and has atypical headache. Her blood pressure and heart rate were elevated when checked at home and upon arrival to the emergency room she was noted to be in new onset atrial fibrillation rapid ventricular rate. Patient was given IV diltiazem IV heparin and her Toprol-XL was increased from 50 mg daily to twice daily. Patient converted back to sinus rhythm within several hours and remains in sinus rhythm. Suspect that the episode of the atrial fibrillation may have been vagal mediated at the time of her nausea and vomiting. At this point will not commit the patient to long-term anticoagulation. For now we will continue usual Toprol-XL 50 mg daily. Should she have future episodes of spontaneous atrial fibrillation without obvious provocation would consider increasing the dose of metoprolol and adding eliquis at that point in time. Her echocardiogram during this admission is relatively unremarkable other than for some mild late systolic mitral valve prolapse of 1+ mitral valve insufficiency. Patient should be cleared for discharge later today and arrangements will be made for her to have an outpatient follow-up visit in 2 to 3 weeks.      5/20- ECG done today shows NSR and her metoprolol will be kept at 50 mg daily.     Today, patient denies current and previous palpitations and states she is feeling great. No changes made to medications, continue metoprolol 50 mg daily. Follow-up in 6 months. Her cholesterol readings are quite good in the absence of statin therapy.        Kailee Jade, APRN-CNP

## 2025-01-03 ENCOUNTER — TELEPHONE (OUTPATIENT)
Dept: PRIMARY CARE | Facility: CLINIC | Age: 85
End: 2025-01-03
Payer: COMMERCIAL

## 2025-01-03 DIAGNOSIS — E03.9 HYPOTHYROIDISM, UNSPECIFIED TYPE: ICD-10-CM

## 2025-01-03 DIAGNOSIS — I10 BENIGN ESSENTIAL HTN: ICD-10-CM

## 2025-03-09 DIAGNOSIS — I10 BENIGN ESSENTIAL HTN: ICD-10-CM

## 2025-03-09 DIAGNOSIS — E03.9 HYPOTHYROIDISM, UNSPECIFIED TYPE: ICD-10-CM

## 2025-03-10 RX ORDER — METOPROLOL SUCCINATE 50 MG/1
50 TABLET, EXTENDED RELEASE ORAL DAILY
Qty: 90 TABLET | Refills: 3 | Status: SHIPPED | OUTPATIENT
Start: 2025-03-10

## 2025-03-10 RX ORDER — LEVOTHYROXINE SODIUM 50 UG/1
50 TABLET ORAL DAILY
Qty: 90 TABLET | Refills: 3 | Status: SHIPPED | OUTPATIENT
Start: 2025-03-10

## 2025-04-07 DIAGNOSIS — I10 BENIGN ESSENTIAL HTN: ICD-10-CM

## 2025-04-07 RX ORDER — AMLODIPINE BESYLATE 5 MG/1
5 TABLET ORAL DAILY
Qty: 90 TABLET | Refills: 3 | Status: SHIPPED | OUTPATIENT
Start: 2025-04-07

## 2025-04-15 LAB
ALBUMIN SERPL-MCNC: 4.3 G/DL (ref 3.6–5.1)
ALP SERPL-CCNC: 71 U/L (ref 37–153)
ALT SERPL-CCNC: 14 U/L (ref 6–29)
ANION GAP SERPL CALCULATED.4IONS-SCNC: 8 MMOL/L (CALC) (ref 7–17)
AST SERPL-CCNC: 20 U/L (ref 10–35)
BASOPHILS # BLD AUTO: 80 CELLS/UL (ref 0–200)
BASOPHILS NFR BLD AUTO: 1.2 %
BILIRUB SERPL-MCNC: 0.9 MG/DL (ref 0.2–1.2)
BUN SERPL-MCNC: 18 MG/DL (ref 7–25)
CALCIUM SERPL-MCNC: 9.3 MG/DL (ref 8.6–10.4)
CHLORIDE SERPL-SCNC: 103 MMOL/L (ref 98–110)
CHOLEST SERPL-MCNC: 168 MG/DL
CHOLEST/HDLC SERPL: 3.1 (CALC)
CO2 SERPL-SCNC: 29 MMOL/L (ref 20–32)
CREAT SERPL-MCNC: 0.93 MG/DL (ref 0.6–0.95)
EGFRCR SERPLBLD CKD-EPI 2021: 61 ML/MIN/1.73M2
EOSINOPHIL # BLD AUTO: 302 CELLS/UL (ref 15–500)
EOSINOPHIL NFR BLD AUTO: 4.5 %
ERYTHROCYTE [DISTWIDTH] IN BLOOD BY AUTOMATED COUNT: 12.6 % (ref 11–15)
GLUCOSE SERPL-MCNC: 97 MG/DL (ref 65–99)
HCT VFR BLD AUTO: 40.7 % (ref 35–45)
HDLC SERPL-MCNC: 54 MG/DL
HGB BLD-MCNC: 13.1 G/DL (ref 11.7–15.5)
LDLC SERPL CALC-MCNC: 94 MG/DL (CALC)
LYMPHOCYTES # BLD AUTO: 1735 CELLS/UL (ref 850–3900)
LYMPHOCYTES NFR BLD AUTO: 25.9 %
MCH RBC QN AUTO: 29.7 PG (ref 27–33)
MCHC RBC AUTO-ENTMCNC: 32.2 G/DL (ref 32–36)
MCV RBC AUTO: 92.3 FL (ref 80–100)
MONOCYTES # BLD AUTO: 462 CELLS/UL (ref 200–950)
MONOCYTES NFR BLD AUTO: 6.9 %
NEUTROPHILS # BLD AUTO: 4121 CELLS/UL (ref 1500–7800)
NEUTROPHILS NFR BLD AUTO: 61.5 %
NONHDLC SERPL-MCNC: 114 MG/DL (CALC)
PLATELET # BLD AUTO: 253 THOUSAND/UL (ref 140–400)
PMV BLD REES-ECKER: 10.4 FL (ref 7.5–12.5)
POTASSIUM SERPL-SCNC: 4.4 MMOL/L (ref 3.5–5.3)
PROT SERPL-MCNC: 6.8 G/DL (ref 6.1–8.1)
RBC # BLD AUTO: 4.41 MILLION/UL (ref 3.8–5.1)
SODIUM SERPL-SCNC: 140 MMOL/L (ref 135–146)
TRIGL SERPL-MCNC: 100 MG/DL
TSH SERPL-ACNC: 0.71 MIU/L (ref 0.4–4.5)
WBC # BLD AUTO: 6.7 THOUSAND/UL (ref 3.8–10.8)

## 2025-04-21 ENCOUNTER — OFFICE VISIT (OUTPATIENT)
Dept: CARDIOLOGY | Facility: CLINIC | Age: 85
End: 2025-04-21
Payer: COMMERCIAL

## 2025-04-21 VITALS
HEIGHT: 62 IN | BODY MASS INDEX: 19.41 KG/M2 | HEART RATE: 68 BPM | DIASTOLIC BLOOD PRESSURE: 63 MMHG | WEIGHT: 105.5 LBS | OXYGEN SATURATION: 97 % | SYSTOLIC BLOOD PRESSURE: 113 MMHG

## 2025-04-21 DIAGNOSIS — I48.91 ATRIAL FIBRILLATION WITH RAPID VENTRICULAR RESPONSE (MULTI): ICD-10-CM

## 2025-04-21 DIAGNOSIS — I10 BENIGN ESSENTIAL HTN: Primary | ICD-10-CM

## 2025-04-21 DIAGNOSIS — E78.00 LOW-DENSITY-LIPOID-TYPE (LDL) HYPERLIPOPROTEINEMIA: ICD-10-CM

## 2025-04-21 PROCEDURE — 99214 OFFICE O/P EST MOD 30 MIN: CPT | Performed by: NURSE PRACTITIONER

## 2025-04-21 PROCEDURE — 1159F MED LIST DOCD IN RCRD: CPT | Performed by: NURSE PRACTITIONER

## 2025-04-21 PROCEDURE — 1126F AMNT PAIN NOTED NONE PRSNT: CPT | Performed by: NURSE PRACTITIONER

## 2025-04-21 PROCEDURE — 1160F RVW MEDS BY RX/DR IN RCRD: CPT | Performed by: NURSE PRACTITIONER

## 2025-04-21 PROCEDURE — 1036F TOBACCO NON-USER: CPT | Performed by: NURSE PRACTITIONER

## 2025-04-21 PROCEDURE — 3078F DIAST BP <80 MM HG: CPT | Performed by: NURSE PRACTITIONER

## 2025-04-21 PROCEDURE — G2211 COMPLEX E/M VISIT ADD ON: HCPCS | Performed by: NURSE PRACTITIONER

## 2025-04-21 PROCEDURE — 1123F ACP DISCUSS/DSCN MKR DOCD: CPT | Performed by: NURSE PRACTITIONER

## 2025-04-21 PROCEDURE — 3074F SYST BP LT 130 MM HG: CPT | Performed by: NURSE PRACTITIONER

## 2025-04-21 ASSESSMENT — ENCOUNTER SYMPTOMS
CONSTITUTIONAL NEGATIVE: 1
MUSCULOSKELETAL NEGATIVE: 1
CARDIOVASCULAR NEGATIVE: 1
RESPIRATORY NEGATIVE: 1
GASTROINTESTINAL NEGATIVE: 1
NEUROLOGICAL NEGATIVE: 1

## 2025-04-21 ASSESSMENT — PAIN SCALES - GENERAL: PAINLEVEL_OUTOF10: 0-NO PAIN

## 2025-04-21 ASSESSMENT — PATIENT HEALTH QUESTIONNAIRE - PHQ9
1. LITTLE INTEREST OR PLEASURE IN DOING THINGS: NOT AT ALL
SUM OF ALL RESPONSES TO PHQ9 QUESTIONS 1 AND 2: 0
2. FEELING DOWN, DEPRESSED OR HOPELESS: NOT AT ALL

## 2025-04-21 NOTE — PROGRESS NOTES
"Chief Complaint:   Follow-up and Atrial Fibrillation    History Of Present Illness:    .Ms. Agarwal presents to clinic for a follow-up visit. She states she is feeling well and has no complaints. She denies chest pain, SOB, palpitations, syncope, and leg swelling.         Atrial Fibrillation  Past medical history includes atrial fibrillation.        Last Recorded Vitals:  Blood pressure 113/63, pulse 68, height 1.575 m (5' 2\"), weight 47.9 kg (105 lb 8 oz), SpO2 97%.     Past Medical History:  Medical History[1]     Past Surgical History:  Surgical History[2]    Social History:  Social History[3]    Family History:  Family History[4]      Allergies:  Propoxyphene and Sertraline    Outpatient Medications:  Current Medications[5]     Physical Exam:  Cardiovascular:      PMI at left midclavicular line. Normal rate. Regular rhythm. Normal S1. Normal S2.       Murmurs: There is no murmur.      No gallop.  No click. No rub.   Pulses:     Intact distal pulses.   Edema:     Peripheral edema absent.       ROS:  Review of Systems   Constitutional: Negative.   Cardiovascular: Negative.    Respiratory: Negative.     Skin: Negative.    Musculoskeletal: Negative.    Gastrointestinal: Negative.    Genitourinary: Negative.    Neurological: Negative.           Last Labs: reviewed  CBC -  Lab Results   Component Value Date    WBC 6.7 04/15/2025    HGB 13.1 04/15/2025    HCT 40.7 04/15/2025    MCV 92.3 04/15/2025     04/15/2025       CMP -  Lab Results   Component Value Date    CALCIUM 9.3 04/15/2025    PROT 6.8 04/15/2025    ALBUMIN 4.3 04/15/2025    AST 20 04/15/2025    ALT 14 04/15/2025    ALKPHOS 71 04/15/2025    BILITOT 0.9 04/15/2025       LIPID PANEL -   Lab Results   Component Value Date    CHOL 168 04/15/2025    TRIG 100 04/15/2025    HDL 54 04/15/2025    CHHDL 3.1 04/15/2025    NHDL 114 04/15/2025       RENAL FUNCTION PANEL -   Lab Results   Component Value Date    GLUCOSE 97 04/15/2025     04/15/2025    K 4.4 " 04/15/2025     04/15/2025    CO2 29 04/15/2025    ANIONGAP 8 04/15/2025    BUN 18 04/15/2025    CREATININE 0.93 04/15/2025    CALCIUM 9.3 04/15/2025    ALBUMIN 4.3 04/15/2025        Lab Results   Component Value Date    HGBA1C 5.3 04/10/2024         Assessment/Plan   Problem List Items Addressed This Visit    None  Hospital course 05/2024  Paroxysmal atrial fibrillation- This patient is a quite pleasant 83-year-old white female whose past medical history is notable for hypertension treated with Toprol-XL 50 mg daily and amlodipine 5 mg daily.  Patient does have hypothyroidism with Synthroid replacement 50 mcg daily.  She denies any history of diabetes or hyperlipidemia.  She was a very remote smoker and quit at the age of about 41.  Her only operative procedure has been a left carpal tunnel release and hernia repair.  The patient is relatively active walks approximately 5 miles per day and golfs regularly in a league.     Patient was in usual state of health.  She went to a doctor's appointment with her  who has had eczema and was seeing an allergist.  Patient was stressed about the doctor's appointment.  Upon returning home her head felt like it would explode but not like a traditional headache.  She felt nauseated and vomited several times.  She checked her blood pressure which was elevated as well as the heart rate.  Her  advised that she come to the emergency room.     Initial evaluation in the emergency room included a chest x-ray which was clear with some findings suggestive of COPD.  The CBC and differential were normal.  Comprehensive metabolic panel was normal other than for glucose of 146.  High-sensitivity troponins were 22 repeated at 21.  The basic metabolic panel from today is in normal range with glucose of 102.  TSH was 1.73.  Patient was noted to be in atrial fibrillation with a rapid ventricular rate.  She was started on IV diltiazem infusion along with low intensity IV heparin  infusion.  Her Toprol-XL was increased from 50 mg daily to twice daily.  The patient converted back to sinus rhythm at approximately 10:30 PM last evening.  She just had an echocardiogram completed reviewed preliminary basis.  This demonstrates an LV ejection fraction that is preserved at 60%.  She does have mild late systolic mitral valve prolapse with 1-2+ mitral valve insufficiency normal left atrial size no visible thrombus.  She also has some mild age-related aortic valve sclerosis.     4/24: This patient is a very pleasant intact 83-year-old white female with a history of hypertension treated with metoprolol and amlodipine, hypothyroidism requiring levothyroxine 50 mcg daily, remote smoking. Patient was feeling well until last today afternoon when she developed nausea vomiting and has atypical headache. Her blood pressure and heart rate were elevated when checked at home and upon arrival to the emergency room she was noted to be in new onset atrial fibrillation rapid ventricular rate. Patient was given IV diltiazem IV heparin and her Toprol-XL was increased from 50 mg daily to twice daily. Patient converted back to sinus rhythm within several hours and remains in sinus rhythm. Suspect that the episode of the atrial fibrillation may have been vagal mediated at the time of her nausea and vomiting. At this point will not commit the patient to long-term anticoagulation. For now we will continue usual Toprol-XL 50 mg daily. Should she have future episodes of spontaneous atrial fibrillation without obvious provocation would consider increasing the dose of metoprolol and adding eliquis at that point in time. Her echocardiogram during this admission is relatively unremarkable other than for some mild late systolic mitral valve prolapse of 1+ mitral valve insufficiency. Patient should be cleared for discharge later today and arrangements will be made for her to have an outpatient follow-up visit in 2 to 3 weeks.       5/20- ECG done today shows NSR and her metoprolol will be kept at 50 mg daily.       Afib. Today, patient denies current and previous palpitations and states she is feeling great. No changes made to medications, continue metoprolol 50 mg daily. Follow-up in 6 months.   Hyperlipidemia. Her cholesterol readings are quite good in the absence of statin therapy.  3.  Hypertension.  Continue amlodipine.            Kailee Jade, APRN-CNP       [1]   Past Medical History:  Diagnosis Date    Encounter for screening mammogram for malignant neoplasm of breast     Visit for screening mammogram    Hypertension     Hypothyroid     Other conditions influencing health status     Chronic Gastric Ulcer With Hemorrhage    Personal history of other diseases of the digestive system     History of esophageal reflux   [2]   Past Surgical History:  Procedure Laterality Date    HERNIA REPAIR  04/15/2013    Hernia Repair    MR HEAD ANGIO W AND WO IV CONTRAST  1/4/2012    MR HEAD ANGIO W AND WO IV CONTRAST [facility]   [3]   Social History  Socioeconomic History    Marital status:    Tobacco Use    Smoking status: Former     Current packs/day: 0.25     Average packs/day: 0.3 packs/day for 55.3 years (13.8 ttl pk-yrs)     Types: Cigarettes     Start date: 1970     Passive exposure: Never    Smokeless tobacco: Never   Substance and Sexual Activity    Alcohol use: Never    Drug use: Never     Social Drivers of Health     Financial Resource Strain: Medium Risk (7/17/2024)    Overall Financial Resource Strain (CARDIA)     Difficulty of Paying Living Expenses: Somewhat hard   Food Insecurity: No Food Insecurity (7/18/2024)    Hunger Vital Sign     Worried About Running Out of Food in the Last Year: Never true     Ran Out of Food in the Last Year: Never true   Transportation Needs: No Transportation Needs (7/17/2024)    PRAPARE - Transportation     Lack of Transportation (Medical): No     Lack of Transportation (Non-Medical):  No   Physical Activity: Inactive (2024)    Exercise Vital Sign     Days of Exercise per Week: 0 days     Minutes of Exercise per Session: 0 min   Stress: No Stress Concern Present (2024)    [employer] - Occupational Stress Questionnaire     Feeling of Stress : Not at all   Social Connections: Unknown (2024)    Social Connection and Isolation Panel [NHANES]     Frequency of Communication with Friends and Family: More than three times a week     Frequency of Social Gatherings with Friends and Family: More than three times a week     Attends Jain Services: Patient declined     Active Member of Clubs or Organizations: Patient declined     Attends Club or Organization Meetings: Patient declined     Marital Status:    Intimate Partner Violence: Not At Risk (2024)    Humiliation, Afraid, Rape, and Kick questionnaire     Fear of Current or Ex-Partner: No     Emotionally Abused: No     Physically Abused: No     Sexually Abused: No   Housing Stability: Low Risk  (2024)    Housing Stability Vital Sign     Unable to Pay for Housing in the Last Year: No     Number of Times Moved in the Last Year: 1     Homeless in the Last Year: No   [4]   Family History  Problem Relation Name Age of Onset    Hypertension Mother      Heart attack Father           age 66    Cancer Daughter     [5]   Current Outpatient Medications   Medication Sig Dispense Refill    ALPRAZolam (Xanax) 0.25 mg tablet TAKE 1 TABLET BY MOUTH THREE TIMES DAILY AS NEEDED FOR ANXIETY 90 tablet 0    amLODIPine (Norvasc) 5 mg tablet Take 1 tablet by mouth once daily 90 tablet 3    fluticasone (Flonase) 50 mcg/actuation nasal spray Administer 1 spray into each nostril once daily. Shake gently. Before first use, prime pump. After use, clean tip and replace cap. 16 g 0    levothyroxine (Synthroid, Levoxyl) 50 mcg tablet Take 1 tablet by mouth once daily 90 tablet 3    loratadine (Claritin) 10 mg tablet  Take 1 tablet (10 mg) by mouth once daily. 30 tablet 0    metoprolol succinate XL (Toprol-XL) 50 mg 24 hr tablet Take 1 tablet by mouth once daily 90 tablet 3     No current facility-administered medications for this visit.

## 2025-04-22 ENCOUNTER — APPOINTMENT (OUTPATIENT)
Dept: PRIMARY CARE | Facility: CLINIC | Age: 85
End: 2025-04-22
Payer: COMMERCIAL

## 2025-04-22 VITALS
WEIGHT: 105 LBS | BODY MASS INDEX: 19.83 KG/M2 | HEIGHT: 61 IN | SYSTOLIC BLOOD PRESSURE: 148 MMHG | OXYGEN SATURATION: 97 % | DIASTOLIC BLOOD PRESSURE: 70 MMHG | HEART RATE: 67 BPM | TEMPERATURE: 96.6 F

## 2025-04-22 DIAGNOSIS — Z12.31 ENCOUNTER FOR SCREENING MAMMOGRAM FOR BREAST CANCER: ICD-10-CM

## 2025-04-22 DIAGNOSIS — Z78.0 POSTMENOPAUSAL STATUS: ICD-10-CM

## 2025-04-22 DIAGNOSIS — I10 BENIGN ESSENTIAL HTN: ICD-10-CM

## 2025-04-22 LAB
POC APPEARANCE, URINE: CLEAR
POC BILIRUBIN, URINE: NEGATIVE
POC BLOOD, URINE: NEGATIVE
POC COLOR, URINE: YELLOW
POC GLUCOSE, URINE: NEGATIVE MG/DL
POC KETONES, URINE: NEGATIVE MG/DL
POC LEUKOCYTES, URINE: NEGATIVE
POC NITRITE,URINE: NEGATIVE
POC PH, URINE: 7 PH
POC PROTEIN, URINE: NEGATIVE MG/DL
POC SPECIFIC GRAVITY, URINE: 1.01
POC UROBILINOGEN, URINE: 0.2 EU/DL

## 2025-04-22 PROCEDURE — 3078F DIAST BP <80 MM HG: CPT | Performed by: FAMILY MEDICINE

## 2025-04-22 PROCEDURE — G0439 PPPS, SUBSEQ VISIT: HCPCS | Performed by: FAMILY MEDICINE

## 2025-04-22 PROCEDURE — 81003 URINALYSIS AUTO W/O SCOPE: CPT | Performed by: FAMILY MEDICINE

## 2025-04-22 PROCEDURE — 1126F AMNT PAIN NOTED NONE PRSNT: CPT | Performed by: FAMILY MEDICINE

## 2025-04-22 PROCEDURE — 3077F SYST BP >= 140 MM HG: CPT | Performed by: FAMILY MEDICINE

## 2025-04-22 PROCEDURE — 1159F MED LIST DOCD IN RCRD: CPT | Performed by: FAMILY MEDICINE

## 2025-04-22 PROCEDURE — 1036F TOBACCO NON-USER: CPT | Performed by: FAMILY MEDICINE

## 2025-04-22 PROCEDURE — 1170F FXNL STATUS ASSESSED: CPT | Performed by: FAMILY MEDICINE

## 2025-04-22 PROCEDURE — 1123F ACP DISCUSS/DSCN MKR DOCD: CPT | Performed by: FAMILY MEDICINE

## 2025-04-22 ASSESSMENT — ENCOUNTER SYMPTOMS
OCCASIONAL FEELINGS OF UNSTEADINESS: 0
DEPRESSION: 0
LOSS OF SENSATION IN FEET: 0
UNEXPECTED WEIGHT CHANGE: 0
DIAPHORESIS: 0
CHILLS: 0
APPETITE CHANGE: 0

## 2025-04-22 ASSESSMENT — PAIN SCALES - GENERAL: PAINLEVEL_OUTOF10: 0-NO PAIN

## 2025-04-22 ASSESSMENT — ACTIVITIES OF DAILY LIVING (ADL)
BATHING: INDEPENDENT
GROCERY_SHOPPING: INDEPENDENT
DOING_HOUSEWORK: INDEPENDENT
MANAGING_FINANCES: INDEPENDENT
TAKING_MEDICATION: INDEPENDENT
DRESSING: INDEPENDENT

## 2025-04-22 NOTE — ASSESSMENT & PLAN NOTE
Stable with current management. Continue following up with cardiology.  Orders:    POCT UA Automated manually resulted

## 2025-04-22 NOTE — PROGRESS NOTES
"Subjective   Patient ID: Caryn Agarwal is a 84 y.o. female who presents for Medicare Annual Wellness Visit Subsequent (EKG - sees cardiology/Tdap booster 1/2016/Pneumonia vaccine-  declines today/Mammogram  5/2024  order today/Bone Density-  unsure of last test-  ordered today/).    [facility]  Patient Care Team:  Lux Peguero MD as PCP - General (Family Medicine)    Caryn Agarwal is seen for comprehensive physical exam.  PMH, PSH, family history and social history were reviewed and updated.    Patient reports her health has been good. Saw her cardiologist yesterday and all was well.     Patient sees a dentist and eye doctor regularly.     Patient is walking 20,000 steps a day most days for exercise. Patient also plays golf three times a week. Patient reports a good diet.     No cigarettes,no alcohol, no other drugs.     Patient lives at home with . She feels safe.     Discussed shingrix and she is not interested.           Review of Systems   Constitutional:  Negative for appetite change, chills, diaphoresis and unexpected weight change.       Objective   /70 (BP Location: Left arm, Patient Position: Sitting, BP Cuff Size: Adult)   Pulse 67   Temp 35.9 °C (96.6 °F)   Ht 1.549 m (5' 1\")   Wt 47.6 kg (105 lb)   SpO2 97%   BMI 19.84 kg/m²     Physical Exam  Constitutional:       General: She is not in acute distress.     Appearance: Normal appearance. She is not ill-appearing.   HENT:      Head: Normocephalic and atraumatic.      Right Ear: Tympanic membrane, ear canal and external ear normal.      Left Ear: Tympanic membrane, ear canal and external ear normal.      Nose: Nose normal.      Mouth/Throat:      Mouth: Mucous membranes are moist.      Pharynx: Oropharynx is clear.   Eyes:      Extraocular Movements: Extraocular movements intact.      Conjunctiva/sclera: Conjunctivae normal.      Pupils: Pupils are equal, round, and reactive to light.   Cardiovascular:      Rate and Rhythm: Normal rate " and regular rhythm.      Pulses: Normal pulses.      Heart sounds: Normal heart sounds. No murmur heard.     No friction rub. No gallop.   Pulmonary:      Effort: Pulmonary effort is normal.      Breath sounds: Normal breath sounds. No wheezing, rhonchi or rales.   Abdominal:      General: Abdomen is flat. Bowel sounds are normal. There is no distension.      Palpations: Abdomen is soft.      Tenderness: There is no abdominal tenderness.   Skin:     General: Skin is warm and dry.      Capillary Refill: Capillary refill takes less than 2 seconds.   Neurological:      Mental Status: She is alert.         Assessment/Plan   Assessment & Plan  Encounter for screening mammogram for breast cancer  Ordered Bl mammogram.        Postmenopausal status  Patient declines DEXA scan.       Benign essential HTN  Stable with current management. Continue following up with cardiology.  Orders:    POCT UA Automated manually resulted      Follow up 1 Year, sooner with any problems or concerns.   I was present with the medical student who participated in the documentation of this note. I have personally seen and examined the patient and performed the medical decision-making components. I have reviewed the medical student documentation and verified the findings in the note as written with additions or exceptions as stated in the body of the note.  Lux Peguero MD

## 2025-05-30 ENCOUNTER — HOSPITAL ENCOUNTER (OUTPATIENT)
Dept: RADIOLOGY | Facility: CLINIC | Age: 85
Discharge: HOME | End: 2025-05-30
Payer: COMMERCIAL

## 2025-05-30 VITALS — HEIGHT: 61 IN | WEIGHT: 105 LBS | BODY MASS INDEX: 19.83 KG/M2

## 2025-05-30 DIAGNOSIS — Z12.31 ENCOUNTER FOR SCREENING MAMMOGRAM FOR BREAST CANCER: ICD-10-CM

## 2025-05-30 PROCEDURE — 77063 BREAST TOMOSYNTHESIS BI: CPT

## 2025-05-30 PROCEDURE — 77067 SCR MAMMO BI INCL CAD: CPT | Performed by: RADIOLOGY

## 2025-05-30 PROCEDURE — 77063 BREAST TOMOSYNTHESIS BI: CPT | Performed by: RADIOLOGY

## 2025-07-08 ENCOUNTER — APPOINTMENT (OUTPATIENT)
Dept: CARDIOLOGY | Facility: HOSPITAL | Age: 85
End: 2025-07-08
Payer: COMMERCIAL

## 2025-07-08 ENCOUNTER — HOSPITAL ENCOUNTER (EMERGENCY)
Facility: HOSPITAL | Age: 85
Discharge: HOME | End: 2025-07-08
Attending: STUDENT IN AN ORGANIZED HEALTH CARE EDUCATION/TRAINING PROGRAM
Payer: COMMERCIAL

## 2025-07-08 VITALS
SYSTOLIC BLOOD PRESSURE: 123 MMHG | OXYGEN SATURATION: 99 % | WEIGHT: 102 LBS | HEIGHT: 62 IN | RESPIRATION RATE: 18 BRPM | HEART RATE: 57 BPM | BODY MASS INDEX: 18.77 KG/M2 | TEMPERATURE: 97.9 F | DIASTOLIC BLOOD PRESSURE: 72 MMHG

## 2025-07-08 DIAGNOSIS — I48.0 PAROXYSMAL ATRIAL FIBRILLATION (MULTI): Primary | ICD-10-CM

## 2025-07-08 LAB
ALBUMIN SERPL BCP-MCNC: 3.2 G/DL (ref 3.4–5)
ALP SERPL-CCNC: 49 U/L (ref 33–136)
ALT SERPL W P-5'-P-CCNC: 19 U/L (ref 7–45)
ANION GAP SERPL CALCULATED.3IONS-SCNC: 8 MMOL/L (ref 10–20)
AST SERPL W P-5'-P-CCNC: 25 U/L (ref 9–39)
BASOPHILS # BLD AUTO: 0.06 X10*3/UL (ref 0–0.1)
BASOPHILS NFR BLD AUTO: 1 %
BILIRUB SERPL-MCNC: 0.5 MG/DL (ref 0–1.2)
BNP SERPL-MCNC: 229 PG/ML (ref 0–99)
BUN SERPL-MCNC: 17 MG/DL (ref 6–23)
CALCIUM SERPL-MCNC: 7.5 MG/DL (ref 8.6–10.3)
CARDIAC TROPONIN I PNL SERPL HS: 13 NG/L (ref 0–13)
CARDIAC TROPONIN I PNL SERPL HS: 19 NG/L (ref 0–13)
CHLORIDE SERPL-SCNC: 113 MMOL/L (ref 98–107)
CO2 SERPL-SCNC: 25 MMOL/L (ref 21–32)
CREAT SERPL-MCNC: 0.71 MG/DL (ref 0.5–1.05)
EGFRCR SERPLBLD CKD-EPI 2021: 84 ML/MIN/1.73M*2
EOSINOPHIL # BLD AUTO: 0.14 X10*3/UL (ref 0–0.4)
EOSINOPHIL NFR BLD AUTO: 2.2 %
ERYTHROCYTE [DISTWIDTH] IN BLOOD BY AUTOMATED COUNT: 13.4 % (ref 11.5–14.5)
GLUCOSE SERPL-MCNC: 88 MG/DL (ref 74–99)
HCT VFR BLD AUTO: 37.5 % (ref 36–46)
HGB BLD-MCNC: 12 G/DL (ref 12–16)
IMM GRANULOCYTES # BLD AUTO: 0.02 X10*3/UL (ref 0–0.5)
IMM GRANULOCYTES NFR BLD AUTO: 0.3 % (ref 0–0.9)
LYMPHOCYTES # BLD AUTO: 1.47 X10*3/UL (ref 0.8–3)
LYMPHOCYTES NFR BLD AUTO: 23.4 %
MAGNESIUM SERPL-MCNC: 1.8 MG/DL (ref 1.6–2.4)
MCH RBC QN AUTO: 30.2 PG (ref 26–34)
MCHC RBC AUTO-ENTMCNC: 32 G/DL (ref 32–36)
MCV RBC AUTO: 95 FL (ref 80–100)
MONOCYTES # BLD AUTO: 0.41 X10*3/UL (ref 0.05–0.8)
MONOCYTES NFR BLD AUTO: 6.5 %
NEUTROPHILS # BLD AUTO: 4.18 X10*3/UL (ref 1.6–5.5)
NEUTROPHILS NFR BLD AUTO: 66.6 %
NRBC BLD-RTO: 0 /100 WBCS (ref 0–0)
PLATELET # BLD AUTO: 194 X10*3/UL (ref 150–450)
POTASSIUM SERPL-SCNC: 3.4 MMOL/L (ref 3.5–5.3)
PROT SERPL-MCNC: 5.2 G/DL (ref 6.4–8.2)
RBC # BLD AUTO: 3.97 X10*6/UL (ref 4–5.2)
SODIUM SERPL-SCNC: 143 MMOL/L (ref 136–145)
TSH SERPL-ACNC: 1.53 MIU/L (ref 0.44–3.98)
WBC # BLD AUTO: 6.3 X10*3/UL (ref 4.4–11.3)

## 2025-07-08 PROCEDURE — 84484 ASSAY OF TROPONIN QUANT: CPT | Performed by: STUDENT IN AN ORGANIZED HEALTH CARE EDUCATION/TRAINING PROGRAM

## 2025-07-08 PROCEDURE — 36415 COLL VENOUS BLD VENIPUNCTURE: CPT | Performed by: STUDENT IN AN ORGANIZED HEALTH CARE EDUCATION/TRAINING PROGRAM

## 2025-07-08 PROCEDURE — 99284 EMERGENCY DEPT VISIT MOD MDM: CPT | Mod: 25 | Performed by: STUDENT IN AN ORGANIZED HEALTH CARE EDUCATION/TRAINING PROGRAM

## 2025-07-08 PROCEDURE — 84443 ASSAY THYROID STIM HORMONE: CPT | Performed by: STUDENT IN AN ORGANIZED HEALTH CARE EDUCATION/TRAINING PROGRAM

## 2025-07-08 PROCEDURE — 83880 ASSAY OF NATRIURETIC PEPTIDE: CPT | Performed by: STUDENT IN AN ORGANIZED HEALTH CARE EDUCATION/TRAINING PROGRAM

## 2025-07-08 PROCEDURE — 80053 COMPREHEN METABOLIC PANEL: CPT | Performed by: STUDENT IN AN ORGANIZED HEALTH CARE EDUCATION/TRAINING PROGRAM

## 2025-07-08 PROCEDURE — 2500000001 HC RX 250 WO HCPCS SELF ADMINISTERED DRUGS (ALT 637 FOR MEDICARE OP): Performed by: STUDENT IN AN ORGANIZED HEALTH CARE EDUCATION/TRAINING PROGRAM

## 2025-07-08 PROCEDURE — 2500000004 HC RX 250 GENERAL PHARMACY W/ HCPCS (ALT 636 FOR OP/ED): Performed by: STUDENT IN AN ORGANIZED HEALTH CARE EDUCATION/TRAINING PROGRAM

## 2025-07-08 PROCEDURE — 96361 HYDRATE IV INFUSION ADD-ON: CPT

## 2025-07-08 PROCEDURE — 93005 ELECTROCARDIOGRAM TRACING: CPT

## 2025-07-08 PROCEDURE — 85025 COMPLETE CBC W/AUTO DIFF WBC: CPT | Performed by: STUDENT IN AN ORGANIZED HEALTH CARE EDUCATION/TRAINING PROGRAM

## 2025-07-08 PROCEDURE — 83735 ASSAY OF MAGNESIUM: CPT | Performed by: STUDENT IN AN ORGANIZED HEALTH CARE EDUCATION/TRAINING PROGRAM

## 2025-07-08 PROCEDURE — 96360 HYDRATION IV INFUSION INIT: CPT

## 2025-07-08 RX ORDER — BISMUTH SUBSALICYLATE 262 MG
1 TABLET,CHEWABLE ORAL DAILY
COMMUNITY

## 2025-07-08 RX ORDER — METOPROLOL SUCCINATE 50 MG/1
50 TABLET, EXTENDED RELEASE ORAL ONCE
Status: COMPLETED | OUTPATIENT
Start: 2025-07-08 | End: 2025-07-08

## 2025-07-08 RX ORDER — IBUPROFEN 200 MG
200 TABLET ORAL EVERY 6 HOURS PRN
COMMUNITY

## 2025-07-08 RX ADMIN — SODIUM CHLORIDE, SODIUM LACTATE, POTASSIUM CHLORIDE, AND CALCIUM CHLORIDE 500 ML: 600; 310; 30; 20 INJECTION, SOLUTION INTRAVENOUS at 06:48

## 2025-07-08 RX ADMIN — METOPROLOL SUCCINATE 50 MG: 50 TABLET, EXTENDED RELEASE ORAL at 06:48

## 2025-07-08 ASSESSMENT — PAIN - FUNCTIONAL ASSESSMENT: PAIN_FUNCTIONAL_ASSESSMENT: 0-10

## 2025-07-08 ASSESSMENT — PAIN SCALES - GENERAL: PAINLEVEL_OUTOF10: 0 - NO PAIN

## 2025-07-08 NOTE — PROGRESS NOTES
Pharmacy Medication History Review    Caryn Agarwal is a 85 y.o. female. Pharmacy reviewed the patient's [id number] medications and allergies for accuracy.    Medications ADDED:  Ibuprofen 200mg  Multivitamin   Krill oil oral  Medications CHANGED:  Flonase - not taking  Loratadine 10mg - not taking   Medications REMOVED:   None      The list below reflects the updated PTA list. Comments regarding how patient may be taking medications differently can be found in the Admit Orders Activity  Prior to Admission Medications   Prescriptions Last Dose Informant   ALPRAZolam (Xanax) 0.25 mg tablet 7/7/2025 Evening Self   Sig: TAKE 1 TABLET BY MOUTH THREE TIMES DAILY AS NEEDED FOR ANXIETY   KRILL OIL ORAL 7/7/2025 Noon Self   Sig: Take 1 each by mouth once daily.   amLODIPine (Norvasc) 5 mg tablet 7/7/2025 Morning Self   Sig: Take 1 tablet by mouth once daily   ibuprofen 200 mg tablet Past Month Self   Sig: Take 1 tablet (200 mg) by mouth every 6 hours if needed for mild pain (1 - 3).   levothyroxine (Synthroid, Levoxyl) 50 mcg tablet 7/8/2025 Morning Self   Sig: Take 1 tablet by mouth once daily   metoprolol succinate XL (Toprol-XL) 50 mg 24 hr tablet 7/7/2025 Morning Self   Sig: Take 1 tablet by mouth once daily   multivitamin tablet 7/7/2025 Morning Self   Sig: Take 1 tablet by mouth once daily.      Facility-Administered Medications: None        The list below reflects the updated allergy list. Please review each documented allergy for additional clarification and justification.  Allergies  Reviewed by Daniella Art CPhT on 7/8/2025        Severity Reactions Comments    Propoxyphene Medium Other, Hallucinations Hallucinations, hyper, change in mental status,    Sertraline Medium Insomnia             Pharmacy has been updated to [facility].    Sources used to complete the med history include dispense history, PTA medication list, patient interview. Patient is a good historian.    Below are additional  concerns with the patient's PTA list.  None     Daniella Art, CPFartun-Adv  Please reach out via [employer] Secure Chat for questions

## 2025-07-08 NOTE — DISCHARGE INSTRUCTIONS
Call your cardiologist to schedule a follow up appointment for further investigation and management of your symptoms, it is likely that you had an episode of atrial fibrillation which resolved on its own.  Continue taking your metoprolol as prescribed each day.  Most of the time, palpitations that are not associated with chest pain, lightheadedness, shortness of breath, or passing out are not dangerous. Your workup today has not shown any damage to the heart or any concerning arrhythmia on heart monitoring. If you have these symptoms or any new or concerning symptoms, you should return to the ED for further evaluation. You may require a temporary heart monitoring device to catch these episodes for further evaluation of your palpitations which can be done by your primary care physician or cardiologist.

## 2025-07-08 NOTE — ED PROVIDER NOTES
HPI   Chief Complaint   Patient presents with    Rapid Heart Rate       This is an 85-year-old female with a past medical history of paroxysmal A-fib, hyperlipidemia, hypertension, anxiety, GERD presenting to the ED for evaluation of palpitations.  She states that she felt her heart was racing last night, starting just before she was able to fall asleep.  She states she was already in bed when her symptoms began.  She did fall asleep and slept through the night, upon waking up this morning she still felt like she had a racing heartbeat so she came to the ED for evaluation.  She denies any shortness of breath or chest pain associated with this.  She states that she thinks she may have gone back into atrial fibrillation.  She states that on arrival to the ED this sensation has resolved.  She states that she thinks it may have stopped some time and route to the hospital while she was in the ambulance.  She did not take her metoprolol this morning but denies any other missed doses or any recent illness including cough or congestion, sore throat, vomiting or diarrhea.      History provided by:  Patient   used: No            Patient History   Medical History[1]  Surgical History[2]  Family History[3]  Social History[4]    Physical Exam   ED Triage Vitals [07/08/25 0651]   Temperature Heart Rate Respirations BP   36.6 °C (97.9 °F) 61 15 135/71      Pulse Ox Temp Source Heart Rate Source Patient Position   98 % Oral -- Sitting      BP Location FiO2 (%)     Left arm --       Physical Exam  General: well developed, well nourished elderly female who is awake and alert, in no apparent distress  HENT: normocephalic, atraumatic.   CV: regular rate and rhythm, no murmur, no gallops, or rubs. radial and dorsalis pedis pulses +2/4 bilaterally  Resp: clear to ascultation bilaterally, no wheezes, rales, or rhonchi  GI: abdomen soft, nontender without rigidity or guarding, no peritoneal signs, abdomen is  nondistended, no masses palpated  MSK: strength +5/5 to upper and lower extremities bilaterally, no swelling of the extremities.  Psych: appropriate mood and affect, cooperative with exam  Skin: warm, dry, without evidence of rash or abrasions    ED Course & MDM   ED Course as of 07/08/25 0950   Tue Jul 08, 2025 0607 EKG interpretation shows normal sinus rhythm, rate of 66 bpm.  Normal axis.  QTc 454 ms, PA of 150.  No ST elevation or depression, no acute ischemic pattern.  No STEMI.  Left anterior fascicular block present.  Mild patient movement artifact. [NT]      ED Course User Index  [NT] Navdeep Mejia,          Diagnoses as of 07/08/25 0950   Paroxysmal atrial fibrillation (Multi)                 No data recorded     Navjot Coma Scale Score: 15 (07/08/25 0722 : Jeannie aCmpos RN)                           Medical Decision Making  The patient is awake and alert, no apparent distress on arrival to the ED.  Heart rate is normal, initial EKG shows normal sinus rhythm.  The rhythm strips from EMS crew also show normal sinus rhythm, no evidence of A-fib or RVR.  No PVCs or any ectopy noted on her EKG here either.  She otherwise states that her symptoms have resolved and she feels well.  She was given a dose of her metoprolol as well as a small bolus of IV fluids.  Cardiac and metabolic workup was ordered here to assess for any underlying cause for her palpitations.  This was grossly unremarkable.  She has normal troponin, normal thyroid function testing, normal electrolyte levels.  No leukocytosis or signs of infection.  No significant anemia.  On exam she has no swelling in the legs, no shortness of breath, no crackles in the lungs, clinically no signs of heart failure.    The patient's troponin did slightly uptrend from 13-19.  She has no recurrence of any palpitations or arrhythmia.  No recurrence of any symptoms on my reassessment.  She has no chest pain, no shortness of breath, no complaints at this  time.  We discussed that she could be observed in the hospital after her elevated troponin which was likely due to the elevated heart rate that she experienced throughout the night, or I feel that she could be safely discharged home at this time given her known history of paroxysmal atrial fibrillation and the fact that her symptoms have not recurred whatsoever during her stay in the ED, and her EKG shows no evidence of acute ischemia.  The patient feels safe going home at this time given that she is not had any recurrence of any symptoms.  She was discharged in stable condition, advised follow-up with her cardiologist.    Labs Reviewed   CBC WITH AUTO DIFFERENTIAL - Abnormal       Result Value    WBC 6.3      nRBC 0.0      RBC 3.97 (*)     Hemoglobin 12.0      Hematocrit 37.5      MCV 95      MCH 30.2      MCHC 32.0      RDW 13.4      Platelets 194      Neutrophils % 66.6      Immature Granulocytes %, Automated 0.3      Lymphocytes % 23.4      Monocytes % 6.5      Eosinophils % 2.2      Basophils % 1.0      Neutrophils Absolute 4.18      Immature Granulocytes Absolute, Automated 0.02      Lymphocytes Absolute 1.47      Monocytes Absolute 0.41      Eosinophils Absolute 0.14      Basophils Absolute 0.06     COMPREHENSIVE METABOLIC PANEL - Abnormal    Glucose 88      Sodium 143      Potassium 3.4 (*)     Chloride 113 (*)     Bicarbonate 25      Anion Gap 8 (*)     Urea Nitrogen 17      Creatinine 0.71      eGFR 84      Calcium 7.5 (*)     Albumin 3.2 (*)     Alkaline Phosphatase 49      Total Protein 5.2 (*)     AST 25      Bilirubin, Total 0.5      ALT 19     B-TYPE NATRIURETIC PEPTIDE - Abnormal     (*)     Narrative:        <100 pg/mL - Heart failure unlikely  100-299 pg/mL - Intermediate probability of acute heart                  failure exacerbation. Correlate with clinical                  context and patient history.    >=300 pg/mL - Heart Failure likely. Correlate with clinical                  context  and patient history.    BNP testing is performed using different testing methodology at [facility] than at other [facility]. Direct result comparisons should only be made within the same method.      SERIAL TROPONIN, 1 HOUR - Abnormal    Troponin I, High Sensitivity 19 (*)     Narrative:     Less than 99th percentile of normal range cutoff-  Female and children under 18 years old <14 ng/L; Male <21 ng/L: Negative  Repeat testing should be performed if clinically indicated.     Female and children under 18 years old 14-50 ng/L; Male 21-50 ng/L:  Consistent with possible cardiac damage and possible increased clinical   risk. Serial measurements may help to assess extent of myocardial damage.     >50 ng/L: Consistent with cardiac damage, increased clinical risk and  myocardial infarction. Serial measurements may help assess extent of   myocardial damage.      NOTE: Children less than 1 year old may have higher baseline troponin   levels and results should be interpreted in conjunction with the overall   clinical context.     NOTE: Troponin I testing is performed using a different   testing methodology at [facility] than at other   [facility]. Direct result comparisons should only   be made within the same method.   MAGNESIUM - Normal    Magnesium 1.80     TSH WITH REFLEX TO FREE T4 IF ABNORMAL - Normal    Thyroid Stimulating Hormone 1.53      Narrative:     TSH testing is performed using different testing methodology at [facility] than at other [facility]. Direct result comparisons should only be made within the same method.     SERIAL TROPONIN-INITIAL - Normal    Troponin I, High Sensitivity 13      Narrative:     Less than 99th percentile of normal range cutoff-  Female and children under 18 years old <14 ng/L; Male <21 ng/L: Negative  Repeat testing should be performed if clinically indicated.     Female and children under 18 years old 14-50 ng/L; Male  21-50 ng/L:  Consistent with possible cardiac damage and possible increased clinical   risk. Serial measurements may help to assess extent of myocardial damage.     >50 ng/L: Consistent with cardiac damage, increased clinical risk and  myocardial infarction. Serial measurements may help assess extent of   myocardial damage.      NOTE: Children less than 1 year old may have higher baseline troponin   levels and results should be interpreted in conjunction with the overall   clinical context.     NOTE: Troponin I testing is performed using a different   testing methodology at [facility] than at other   [facility]. Direct result comparisons should only   be made within the same method.   TROPONIN SERIES- (INITIAL, 1 HR)    Narrative:     The following orders were created for panel order Troponin Series, (0, 1 HR).  Procedure                               Abnormality         Status                     ---------                               -----------         ------                     Troponin I, High Sensiti...[[id number]]  Normal              Final result               Troponin, High Sensitivi...[[id number]]  Abnormal            Final result                 Please view results for these tests on the individual orders.     No orders to display         Procedure  Procedures       [1]   Past Medical History:  Diagnosis Date    Encounter for screening mammogram for malignant neoplasm of breast     Visit for screening mammogram    Hypertension     Hypothyroid     Other conditions influencing health status     Chronic Gastric Ulcer With Hemorrhage    Personal history of other diseases of the digestive system     History of esophageal reflux   [2]   Past Surgical History:  Procedure Laterality Date    HERNIA REPAIR  04/15/2013    Hernia Repair    MR HEAD ANGIO W AND WO IV CONTRAST  1/4/2012    MR HEAD ANGIO W AND WO IV CONTRAST [facility]   [3]   Family History  Problem Relation Name Age of Onset     Hypertension Mother      Heart attack Father           age 66    Cancer Daughter     [4]   Social History  Tobacco Use    Smoking status: Former     Current packs/day: 0.25     Average packs/day: 0.3 packs/day for 55.5 years (13.9 ttl pk-yrs)     Types: Cigarettes     Start date:      Passive exposure: Never    Smokeless tobacco: Never   Substance Use Topics    Alcohol use: Never    Drug use: Never        Navdeep Mejia DO  25 0953     no

## 2025-07-11 LAB
ATRIAL RATE: 66 BPM
P AXIS: 69 DEGREES
P OFFSET: 175 MS
P ONSET: 135 MS
PR INTERVAL: 150 MS
Q ONSET: 210 MS
QRS COUNT: 11 BEATS
QRS DURATION: 90 MS
QT INTERVAL: 434 MS
QTC CALCULATION(BAZETT): 454 MS
QTC FREDERICIA: 448 MS
R AXIS: -54 DEGREES
T AXIS: 64 DEGREES
T OFFSET: 427 MS
VENTRICULAR RATE: 66 BPM

## 2025-07-21 ENCOUNTER — OFFICE VISIT (OUTPATIENT)
Dept: CARDIOLOGY | Facility: CLINIC | Age: 85
End: 2025-07-21
Payer: COMMERCIAL

## 2025-07-21 VITALS
SYSTOLIC BLOOD PRESSURE: 144 MMHG | HEART RATE: 66 BPM | HEIGHT: 62 IN | WEIGHT: 102.5 LBS | OXYGEN SATURATION: 97 % | DIASTOLIC BLOOD PRESSURE: 73 MMHG | BODY MASS INDEX: 18.86 KG/M2

## 2025-07-21 DIAGNOSIS — I10 BENIGN ESSENTIAL HTN: ICD-10-CM

## 2025-07-21 DIAGNOSIS — I48.91 ATRIAL FIBRILLATION WITH RAPID VENTRICULAR RESPONSE (MULTI): Primary | ICD-10-CM

## 2025-07-21 PROCEDURE — 1159F MED LIST DOCD IN RCRD: CPT | Performed by: NURSE PRACTITIONER

## 2025-07-21 PROCEDURE — 3078F DIAST BP <80 MM HG: CPT | Performed by: NURSE PRACTITIONER

## 2025-07-21 PROCEDURE — G2211 COMPLEX E/M VISIT ADD ON: HCPCS | Performed by: NURSE PRACTITIONER

## 2025-07-21 PROCEDURE — 1126F AMNT PAIN NOTED NONE PRSNT: CPT | Performed by: NURSE PRACTITIONER

## 2025-07-21 PROCEDURE — 3077F SYST BP >= 140 MM HG: CPT | Performed by: NURSE PRACTITIONER

## 2025-07-21 PROCEDURE — 99212 OFFICE O/P EST SF 10 MIN: CPT

## 2025-07-21 PROCEDURE — 1160F RVW MEDS BY RX/DR IN RCRD: CPT | Performed by: NURSE PRACTITIONER

## 2025-07-21 PROCEDURE — 99214 OFFICE O/P EST MOD 30 MIN: CPT | Performed by: NURSE PRACTITIONER

## 2025-07-21 ASSESSMENT — PATIENT HEALTH QUESTIONNAIRE - PHQ9
SUM OF ALL RESPONSES TO PHQ9 QUESTIONS 1 AND 2: 0
2. FEELING DOWN, DEPRESSED OR HOPELESS: NOT AT ALL
1. LITTLE INTEREST OR PLEASURE IN DOING THINGS: NOT AT ALL

## 2025-07-21 ASSESSMENT — ENCOUNTER SYMPTOMS
CARDIOVASCULAR NEGATIVE: 1
NEUROLOGICAL NEGATIVE: 1
RESPIRATORY NEGATIVE: 1
CONSTITUTIONAL NEGATIVE: 1
GASTROINTESTINAL NEGATIVE: 1
MUSCULOSKELETAL NEGATIVE: 1

## 2025-07-21 ASSESSMENT — PAIN SCALES - GENERAL: PAINLEVEL_OUTOF10: 0-NO PAIN

## 2025-07-21 NOTE — PROGRESS NOTES
"Chief Complaint:   Hospital Follow-up    History Of Present Illness:    ..Ms. Agarwal presents to clinic for a follow-up visit. She states she is feeling well and has no complaints. She denies chest pain, SOB, palpitations, syncope, and leg swelling.  Called the squad for a hours long episode of afib, but it resolved on the way to the ED.              Last Recorded Vitals:  Blood pressure 144/73, pulse 66, height 1.575 m (5' 2\"), weight 46.5 kg (102 lb 8 oz), SpO2 97%.     Past Medical History:  Medical History[1]     Past Surgical History:  Surgical History[2]    Social History:  Social History[3]    Family History:  Family History[4]      Allergies:  Propoxyphene and Sertraline    Outpatient Medications:  Current Medications[5]     Physical Exam:  Cardiovascular:      PMI at left midclavicular line. Normal rate. Regular rhythm. Normal S1. Normal S2.       Murmurs: There is no murmur.      No gallop.  No click. No rub.   Pulses:     Intact distal pulses.   Edema:     Peripheral edema absent.       ROS:  Review of Systems   Constitutional: Negative.   Cardiovascular: Negative.    Respiratory: Negative.     Skin: Negative.    Musculoskeletal: Negative.    Gastrointestinal: Negative.    Genitourinary: Negative.    Neurological: Negative.           Last Labs: tsh cmp cbc 07/2025 reviewed lipid 04/2025 A1c 04/2024  CBC -  Lab Results   Component Value Date    WBC 6.3 07/08/2025    WBC 6.7 04/15/2025    HGB 12.0 07/08/2025    HGB 13.1 04/15/2025    HCT 37.5 07/08/2025    HCT 40.7 04/15/2025    MCV 95 07/08/2025    MCV 92.3 04/15/2025     07/08/2025     04/15/2025       CMP -  Lab Results   Component Value Date    CALCIUM 7.5 (L) 07/08/2025    CALCIUM 9.3 04/15/2025    PROT 5.2 (L) 07/08/2025    PROT 6.8 04/15/2025    ALBUMIN 3.2 (L) 07/08/2025    ALBUMIN 4.3 04/15/2025    AST 25 07/08/2025    AST 20 04/15/2025    ALT 19 07/08/2025    ALT 14 04/15/2025    ALKPHOS 49 07/08/2025    ALKPHOS 71 04/15/2025    " BILITOT 0.5 07/08/2025    BILITOT 0.9 04/15/2025       LIPID PANEL -   Lab Results   Component Value Date    CHOL 168 04/15/2025    TRIG 100 04/15/2025    HDL 54 04/15/2025    CHHDL 3.1 04/15/2025    NHDL 114 04/15/2025       RENAL FUNCTION PANEL -   Lab Results   Component Value Date    GLUCOSE 88 07/08/2025    GLUCOSE 97 04/15/2025     07/08/2025     04/15/2025    K 3.4 (L) 07/08/2025    K 4.4 04/15/2025     (H) 07/08/2025     04/15/2025    CO2 25 07/08/2025    CO2 29 04/15/2025    ANIONGAP 8 (L) 07/08/2025    ANIONGAP 8 04/15/2025    BUN 17 07/08/2025    BUN 18 04/15/2025    CREATININE 0.71 07/08/2025    CREATININE 0.93 04/15/2025    CALCIUM 7.5 (L) 07/08/2025    CALCIUM 9.3 04/15/2025    ALBUMIN 3.2 (L) 07/08/2025    ALBUMIN 4.3 04/15/2025        Lab Results   Component Value Date     (H) 07/08/2025    HGBA1C 5.3 04/10/2024         Assessment/Plan   Problem List Items Addressed This Visit    None    Hospital course 05/2024  Paroxysmal atrial fibrillation- This patient is a quite pleasant 83-year-old white female whose past medical history is notable for hypertension treated with Toprol-XL 50 mg daily and amlodipine 5 mg daily.  Patient does have hypothyroidism with Synthroid replacement 50 mcg daily.  She denies any history of diabetes or hyperlipidemia.  She was a very remote smoker and quit at the age of about 41.  Her only operative procedure has been a left carpal tunnel release and hernia repair.  The patient is relatively active walks approximately 5 miles per day and golfs regularly in a league.     Patient was in usual state of health.  She went to a doctor's appointment with her  who has had eczema and was seeing an allergist.  Patient was stressed about the doctor's appointment.  Upon returning home her head felt like it would explode but not like a traditional headache.  She felt nauseated and vomited several times.  She checked her blood pressure which was  elevated as well as the heart rate.  Her  advised that she come to the emergency room.     Initial evaluation in the emergency room included a chest x-ray which was clear with some findings suggestive of COPD.  The CBC and differential were normal.  Comprehensive metabolic panel was normal other than for glucose of 146.  High-sensitivity troponins were 22 repeated at 21.  The basic metabolic panel from today is in normal range with glucose of 102.  TSH was 1.73.  Patient was noted to be in atrial fibrillation with a rapid ventricular rate.  She was started on IV diltiazem infusion along with low intensity IV heparin infusion.  Her Toprol-XL was increased from 50 mg daily to twice daily.  The patient converted back to sinus rhythm at approximately 10:30 PM last evening.  She just had an echocardiogram completed reviewed preliminary basis.  This demonstrates an LV ejection fraction that is preserved at 60%.  She does have mild late systolic mitral valve prolapse with 1-2+ mitral valve insufficiency normal left atrial size no visible thrombus.  She also has some mild age-related aortic valve sclerosis.     4/24: This patient is a very pleasant intact 83-year-old white female with a history of hypertension treated with metoprolol and amlodipine, hypothyroidism requiring levothyroxine 50 mcg daily, remote smoking. Patient was feeling well until last today afternoon when she developed nausea vomiting and has atypical headache. Her blood pressure and heart rate were elevated when checked at home and upon arrival to the emergency room she was noted to be in new onset atrial fibrillation rapid ventricular rate. Patient was given IV diltiazem IV heparin and her Toprol-XL was increased from 50 mg daily to twice daily. Patient converted back to sinus rhythm within several hours and remains in sinus rhythm. Suspect that the episode of the atrial fibrillation may have been vagal mediated at the time of her nausea and  vomiting. At this point will not commit the patient to long-term anticoagulation. For now we will continue usual Toprol-XL 50 mg daily. Should she have future episodes of spontaneous atrial fibrillation without obvious provocation would consider increasing the dose of metoprolol and adding eliquis at that point in time. Her echocardiogram during this admission is relatively unremarkable other than for some mild late systolic mitral valve prolapse of 1+ mitral valve insufficiency. Patient should be cleared for discharge later today and arrangements will be made for her to have an outpatient follow-up visit in 2 to 3 weeks.      5/20- ECG done today shows NSR and her metoprolol will be kept at 50 mg daily.       Afib. Today, patient denies current and previous palpitations and states she is feeling great. No changes made to medications, continue metoprolol 50 mg daily. Follow-up in 6 months, but call office for return of symptoms, palpitations.   Hyperlipidemia. Her cholesterol readings are quite good in the absence of statin therapy. Stable.   3.  Hypertension.  Continue amlodipine. /73.         Kailee Jade, APRN-CNP         [1]   Past Medical History:  Diagnosis Date    Encounter for screening mammogram for malignant neoplasm of breast     Visit for screening mammogram    Hypertension     Hypothyroid     Other conditions influencing health status     Chronic Gastric Ulcer With Hemorrhage    Personal history of other diseases of the digestive system     History of esophageal reflux   [2]   Past Surgical History:  Procedure Laterality Date    HERNIA REPAIR  04/15/2013    Hernia Repair    MR HEAD ANGIO W AND WO IV CONTRAST  1/4/2012    MR HEAD ANGIO W AND WO IV CONTRAST [facility]   [3]   Social History  Socioeconomic History    Marital status:    Tobacco Use    Smoking status: Former     Current packs/day: 0.25     Average packs/day: 0.3 packs/day for 55.6 years (13.9 ttl pk-yrs)     Types:  Cigarettes     Start date: 1970     Passive exposure: Never    Smokeless tobacco: Never   Substance and Sexual Activity    Alcohol use: Never    Drug use: Never     Social Drivers of Health     Financial Resource Strain: Medium Risk (7/17/2024)    Overall Financial Resource Strain (CARDIA)     Difficulty of Paying Living Expenses: Somewhat hard   Food Insecurity: No Food Insecurity (7/18/2024)    Hunger Vital Sign     Worried About Running Out of Food in the Last Year: Never true     Ran Out of Food in the Last Year: Never true   Transportation Needs: No Transportation Needs (7/17/2024)    PRAPARE - Transportation     Lack of Transportation (Medical): No     Lack of Transportation (Non-Medical): No   Physical Activity: Inactive (7/18/2024)    Exercise Vital Sign     Days of Exercise per Week: 0 days     Minutes of Exercise per Session: 0 min   Stress: No Stress Concern Present (7/18/2024)    [employer] [place] [employer] - Occupational Stress Questionnaire     Feeling of Stress : Not at all   Social Connections: Unknown (7/18/2024)    Social Connection and Isolation Panel     Frequency of Communication with Friends and Family: More than three times a week     Frequency of Social Gatherings with Friends and Family: More than three times a week     Attends [employer] Services: Patient declined     Active Member of Clubs or Organizations: Patient declined     Attends Club or Organization Meetings: Patient declined     Marital Status:    Intimate Partner Violence: Not At Risk (7/18/2024)    Humiliation, Afraid, Rape, and Kick questionnaire     Fear of Current or Ex-Partner: No     Emotionally Abused: No     Physically Abused: No     Sexually Abused: No   Housing Stability: Low Risk  (7/17/2024)    Housing Stability Vital Sign     Unable to Pay for Housing in the Last Year: No     Number of Times Moved in the Last Year: 1     Homeless in the Last Year: No   [4]   Family History  Problem Relation Name Age  of Onset    Hypertension Mother      Heart attack Father           age 66    Cancer Daughter     [5]   Current Outpatient Medications   Medication Sig Dispense Refill    ALPRAZolam (Xanax) 0.25 mg tablet TAKE 1 TABLET BY MOUTH THREE TIMES DAILY AS NEEDED FOR ANXIETY 90 tablet 0    amLODIPine (Norvasc) 5 mg tablet Take 1 tablet by mouth once daily 90 tablet 3    ibuprofen 200 mg tablet Take 1 tablet (200 mg) by mouth every 6 hours if needed for mild pain (1 - 3).      KRILL OIL ORAL Take 1 each by mouth once daily.      levothyroxine (Synthroid, Levoxyl) 50 mcg tablet Take 1 tablet by mouth once daily 90 tablet 3    metoprolol succinate XL (Toprol-XL) 50 mg 24 hr tablet Take 1 tablet by mouth once daily 90 tablet 3    multivitamin tablet Take 1 tablet by mouth once daily.      fluticasone (Flonase) 50 mcg/actuation nasal spray Administer 1 spray into each nostril once daily. Shake gently. Before first use, prime pump. After use, clean tip and replace cap. (Patient not taking: Reported on 2025) 16 g 0    loratadine (Claritin) 10 mg tablet Take 1 tablet (10 mg) by mouth once daily. (Patient not taking: Reported on 2025) 30 tablet 0     No current facility-administered medications for this visit.

## 2025-07-28 ENCOUNTER — APPOINTMENT (OUTPATIENT)
Dept: OPHTHALMOLOGY | Facility: CLINIC | Age: 85
End: 2025-07-28
Payer: COMMERCIAL

## 2025-08-18 DIAGNOSIS — F41.9 ANXIETY: ICD-10-CM

## 2025-08-18 RX ORDER — ALPRAZOLAM 0.25 MG/1
0.25 TABLET ORAL 3 TIMES DAILY PRN
Qty: 90 TABLET | Refills: 0 | Status: SHIPPED | OUTPATIENT
Start: 2025-08-18

## 2025-10-20 ENCOUNTER — APPOINTMENT (OUTPATIENT)
Dept: CARDIOLOGY | Facility: CLINIC | Age: 85
End: 2025-10-20
Payer: COMMERCIAL